# Patient Record
Sex: MALE | Race: WHITE | NOT HISPANIC OR LATINO | Employment: FULL TIME | URBAN - METROPOLITAN AREA
[De-identification: names, ages, dates, MRNs, and addresses within clinical notes are randomized per-mention and may not be internally consistent; named-entity substitution may affect disease eponyms.]

---

## 2019-01-09 ENCOUNTER — APPOINTMENT (OUTPATIENT)
Dept: RADIOLOGY | Facility: MEDICAL CENTER | Age: 34
End: 2019-01-09
Attending: EMERGENCY MEDICINE
Payer: COMMERCIAL

## 2019-01-09 ENCOUNTER — HOSPITAL ENCOUNTER (EMERGENCY)
Facility: MEDICAL CENTER | Age: 34
End: 2019-01-09
Attending: EMERGENCY MEDICINE
Payer: COMMERCIAL

## 2019-01-09 VITALS
HEART RATE: 63 BPM | DIASTOLIC BLOOD PRESSURE: 80 MMHG | OXYGEN SATURATION: 96 % | SYSTOLIC BLOOD PRESSURE: 118 MMHG | RESPIRATION RATE: 20 BRPM | BODY MASS INDEX: 29.54 KG/M2 | WEIGHT: 222.88 LBS | HEIGHT: 73 IN | TEMPERATURE: 98.6 F

## 2019-01-09 DIAGNOSIS — K52.9 COLITIS: ICD-10-CM

## 2019-01-09 LAB
ALBUMIN SERPL BCP-MCNC: 4.4 G/DL (ref 3.2–4.9)
ALBUMIN/GLOB SERPL: 1.4 G/DL
ALP SERPL-CCNC: 75 U/L (ref 30–99)
ALT SERPL-CCNC: 29 U/L (ref 2–50)
ANION GAP SERPL CALC-SCNC: 9 MMOL/L (ref 0–11.9)
APPEARANCE UR: CLEAR
AST SERPL-CCNC: 20 U/L (ref 12–45)
BASOPHILS # BLD AUTO: 0.8 % (ref 0–1.8)
BASOPHILS # BLD: 0.06 K/UL (ref 0–0.12)
BILIRUB SERPL-MCNC: 1.2 MG/DL (ref 0.1–1.5)
BILIRUB UR QL STRIP.AUTO: NEGATIVE
BUN SERPL-MCNC: 17 MG/DL (ref 8–22)
CALCIUM SERPL-MCNC: 9.7 MG/DL (ref 8.4–10.2)
CHLORIDE SERPL-SCNC: 102 MMOL/L (ref 96–112)
CO2 SERPL-SCNC: 26 MMOL/L (ref 20–33)
COLOR UR: YELLOW
CREAT SERPL-MCNC: 1.31 MG/DL (ref 0.5–1.4)
EOSINOPHIL # BLD AUTO: 0.08 K/UL (ref 0–0.51)
EOSINOPHIL NFR BLD: 1.1 % (ref 0–6.9)
ERYTHROCYTE [DISTWIDTH] IN BLOOD BY AUTOMATED COUNT: 36.5 FL (ref 35.9–50)
GLOBULIN SER CALC-MCNC: 3.2 G/DL (ref 1.9–3.5)
GLUCOSE SERPL-MCNC: 96 MG/DL (ref 65–99)
GLUCOSE UR STRIP.AUTO-MCNC: NEGATIVE MG/DL
HCT VFR BLD AUTO: 48.6 % (ref 42–52)
HGB BLD-MCNC: 17.5 G/DL (ref 14–18)
IMM GRANULOCYTES # BLD AUTO: 0.03 K/UL (ref 0–0.11)
IMM GRANULOCYTES NFR BLD AUTO: 0.4 % (ref 0–0.9)
KETONES UR STRIP.AUTO-MCNC: NEGATIVE MG/DL
LEUKOCYTE ESTERASE UR QL STRIP.AUTO: NEGATIVE
LIPASE SERPL-CCNC: 26 U/L (ref 7–58)
LYMPHOCYTES # BLD AUTO: 2.84 K/UL (ref 1–4.8)
LYMPHOCYTES NFR BLD: 37.5 % (ref 22–41)
MCH RBC QN AUTO: 30.7 PG (ref 27–33)
MCHC RBC AUTO-ENTMCNC: 36 G/DL (ref 33.7–35.3)
MCV RBC AUTO: 85.3 FL (ref 81.4–97.8)
MICRO URNS: NORMAL
MONOCYTES # BLD AUTO: 0.83 K/UL (ref 0–0.85)
MONOCYTES NFR BLD AUTO: 11 % (ref 0–13.4)
NEUTROPHILS # BLD AUTO: 3.73 K/UL (ref 1.82–7.42)
NEUTROPHILS NFR BLD: 49.2 % (ref 44–72)
NITRITE UR QL STRIP.AUTO: NEGATIVE
NRBC # BLD AUTO: 0 K/UL
NRBC BLD-RTO: 0 /100 WBC
PH UR STRIP.AUTO: 5.5 [PH]
PLATELET # BLD AUTO: 231 K/UL (ref 164–446)
PMV BLD AUTO: 10.6 FL (ref 9–12.9)
POTASSIUM SERPL-SCNC: 3.8 MMOL/L (ref 3.6–5.5)
PROT SERPL-MCNC: 7.6 G/DL (ref 6–8.2)
PROT UR QL STRIP: NEGATIVE MG/DL
RBC # BLD AUTO: 5.7 M/UL (ref 4.7–6.1)
RBC UR QL AUTO: NEGATIVE
SODIUM SERPL-SCNC: 137 MMOL/L (ref 135–145)
SP GR UR STRIP.AUTO: 1.02
WBC # BLD AUTO: 7.6 K/UL (ref 4.8–10.8)

## 2019-01-09 PROCEDURE — 700102 HCHG RX REV CODE 250 W/ 637 OVERRIDE(OP): Performed by: EMERGENCY MEDICINE

## 2019-01-09 PROCEDURE — 83690 ASSAY OF LIPASE: CPT

## 2019-01-09 PROCEDURE — 99285 EMERGENCY DEPT VISIT HI MDM: CPT

## 2019-01-09 PROCEDURE — 36415 COLL VENOUS BLD VENIPUNCTURE: CPT

## 2019-01-09 PROCEDURE — 80053 COMPREHEN METABOLIC PANEL: CPT

## 2019-01-09 PROCEDURE — 74176 CT ABD & PELVIS W/O CONTRAST: CPT

## 2019-01-09 PROCEDURE — 85025 COMPLETE CBC W/AUTO DIFF WBC: CPT

## 2019-01-09 PROCEDURE — 81003 URINALYSIS AUTO W/O SCOPE: CPT

## 2019-01-09 PROCEDURE — A9270 NON-COVERED ITEM OR SERVICE: HCPCS | Performed by: EMERGENCY MEDICINE

## 2019-01-09 RX ORDER — CEFDINIR 300 MG/1
300 CAPSULE ORAL 2 TIMES DAILY
Qty: 20 CAP | Refills: 0 | Status: SHIPPED | OUTPATIENT
Start: 2019-01-09 | End: 2020-06-19

## 2019-01-09 RX ORDER — METRONIDAZOLE 500 MG/1
500 TABLET ORAL ONCE
Status: COMPLETED | OUTPATIENT
Start: 2019-01-09 | End: 2019-01-09

## 2019-01-09 RX ORDER — METRONIDAZOLE 500 MG/1
500 TABLET ORAL 3 TIMES DAILY
Qty: 21 TAB | Refills: 0 | Status: SHIPPED | OUTPATIENT
Start: 2019-01-09 | End: 2019-01-16

## 2019-01-09 RX ORDER — CEFDINIR 300 MG/1
300 CAPSULE ORAL ONCE
Status: COMPLETED | OUTPATIENT
Start: 2019-01-09 | End: 2019-01-09

## 2019-01-09 RX ORDER — TRAMADOL HYDROCHLORIDE 50 MG/1
50-100 TABLET ORAL EVERY 6 HOURS PRN
Qty: 20 TAB | Refills: 0 | Status: SHIPPED | OUTPATIENT
Start: 2019-01-09 | End: 2019-01-16

## 2019-01-09 RX ADMIN — METRONIDAZOLE 500 MG: 500 TABLET ORAL at 09:55

## 2019-01-09 RX ADMIN — CEFDINIR 300 MG: 300 CAPSULE ORAL at 09:55

## 2019-01-09 ASSESSMENT — PAIN DESCRIPTION - DESCRIPTORS: DESCRIPTORS: ACHING

## 2019-01-09 ASSESSMENT — PAIN SCALES - GENERAL
PAINLEVEL_OUTOF10: 1
PAINLEVEL_OUTOF10: 1

## 2019-01-09 NOTE — ED NOTES
Med rec complete per pt at bedside  Allergies have been verified   No oral ABX within the last 30 days

## 2019-01-09 NOTE — ED PROVIDER NOTES
"ED Provider Note    CHIEF COMPLAINT  Chief Complaint   Patient presents with   • Abdominal Pain     L upper and lower quadrant, described as \"unconfortable\" rated at 1/10 today, issue going on for the past week with exacerbation when exerting especially when \"working out.\"    • Diarrhea     for the last 3 days, \"watery\" no blood seen.    • Nausea     x1.        HPI  Franco Meléndez is a 33 y.o. male who presents stating that he has had some left upper and lower quadrant abdominal pain described as a low level but going on for about a week especially when he is working out.  He is had some watery diarrhea without blood for the last 3 days and mild nausea without vomiting.  Denies any fevers or chills and denies any other complaints    REVIEW OF SYSTEMS  See HPI for further details. All other systems are negative.     PAST MEDICAL HISTORY  History reviewed. No pertinent past medical history.    FAMILY HISTORY  History reviewed. No pertinent family history.    SOCIAL HISTORY   reports that he has never smoked. He has never used smokeless tobacco. He reports that he drinks alcohol. He reports that he does not use drugs.    SURGICAL HISTORY  History reviewed. No pertinent surgical history.    CURRENT MEDICATIONS  Home Medications     Reviewed by Smith Neal (Pharmacy Tech) on 01/09/19 at 0826  Med List Status: Complete   Medication Last Dose Status   DM-Doxylamine-Acetaminophen (NYQUIL COLD & FLU PO) 1/6/2019 Active   Pseudoephedrine-APAP-DM (DAYQUIL MULTI-SYMPTOM COLD/FLU PO) >1WEEK Active                ALLERGIES  No Known Allergies    PHYSICAL EXAM  VITAL SIGNS: /80   Pulse 74   Temp 37 °C (98.6 °F) (Temporal)   Resp 20   Ht 1.854 m (6' 1\")   Wt 101.1 kg (222 lb 14.2 oz)   SpO2 95%   BMI 29.41 kg/m²    Constitutional: Well developed, Well nourished, No acute distress, Non-toxic appearance.   HENT: Normocephalic, Atraumatic, Bilateral external ears normal, Oropharynx is clear mucous membranes are " moist. No oral exudates or nasal discharge.   Eyes: Pupils are equal round and reactive, EOMI, Conjunctiva normal, No discharge.   Neck: Normal range of motion, No tenderness, Supple, No stridor. No meningismus.  Lymphatic: No lymphadenopathy noted.   Cardiovascular: Regular rate and rhythm without murmur rub or gallop.  Thorax & Lungs: Clear breath sounds bilaterally without wheezes, rhonchi or rales. There is no chest wall tenderness.   Abdomen: Soft non-tender non-distended. There is no rebound or guarding. No organomegaly is appreciated. Bowel sounds are normal.  Skin: Normal without rash.   Back: No CVA or spinal tenderness.   Extremities: Intact distal pulses, No edema, No tenderness, No cyanosis, No clubbing. Capillary refill is less than 2 seconds.  Musculoskeletal: Good range of motion in all major joints. No tenderness to palpation or major deformities noted.   Neurologic: Alert & oriented x 3, Normal motor function, Normal sensory function, No focal deficits noted. Reflexes are normal.  Psychiatric: Affect normal, Judgment normal, Mood normal. There is no suicidal ideation or patient reported hallucinations.     RADIOLOGY/PROCEDURES  CT-RENAL COLIC EVALUATION(A/P W/O)   Final Result      1.  Mild diffuse thickening of the wall of the colon which can be seen with infectious colitis.      2.  Normal appendix.      3.  Prior left inguinal hernia repair.            COURSE & MEDICAL DECISION MAKING  Pertinent Labs & Imaging studies reviewed. (See chart for details)  I had a low suspicion for musculoskeletal etiology and a higher suspicion for infectious etiology and possibly even kidney stone.    Laboratory evaluation reveals no leukocytosis, shift, anemia, electrolyte derangements or acidosis.  Urinalysis is negative for infection.    CT scan of the abdomen pelvis reveals mild diffuse thickening of the colonic wall with normal appendix.  This looks like he has had infectious colitis.  There is no evidence of  abscess    Patient is treated with Flagyl and Omnicef as an outpatient.  He will return if any significant change in symptoms since given first dose antibiotics prior to discharge    FINAL IMPRESSION  1. Colitis             Electronically signed by: Estrada Kearney, 1/9/2019 8:42 AM

## 2019-01-09 NOTE — ED TRIAGE NOTES
".  Chief Complaint   Patient presents with   • Abdominal Pain     L upper and lower quadrant, described as \"unconfortable\" rated at 1/10 today, issue going on for the past week with exacerbation when exerting especially when \"working out.\"    • Diarrhea     for the last 3 days, \"watery\" no blood seen.    • Nausea     x1.      .Height 1.854 m (6' 1\"), weight 101.1 kg (222 lb 14.2 oz).      "

## 2019-01-09 NOTE — ED NOTES
Reviewed discharge instructions, printed prescriptions for Flagyl and Ultram, and prescription for Omnicef sent to selected pharmacy w/ pt, verbalized understanding to information provided including follow up care, return precautions and diet, denied further questions/concerns.  Pt thanked RN and ERP for care, ambulated from ED.

## 2019-01-14 ENCOUNTER — HOSPITAL ENCOUNTER (OUTPATIENT)
Dept: LAB | Facility: MEDICAL CENTER | Age: 34
End: 2019-01-14
Attending: INTERNAL MEDICINE
Payer: COMMERCIAL

## 2019-01-14 LAB
CRP SERPL HS-MCNC: 0.07 MG/DL (ref 0–0.75)
ERYTHROCYTE [DISTWIDTH] IN BLOOD BY AUTOMATED COUNT: 38.5 FL (ref 35.9–50)
HCT VFR BLD AUTO: 47.7 % (ref 42–52)
HGB BLD-MCNC: 17 G/DL (ref 14–18)
MCH RBC QN AUTO: 30.9 PG (ref 27–33)
MCHC RBC AUTO-ENTMCNC: 35.6 G/DL (ref 33.7–35.3)
MCV RBC AUTO: 86.7 FL (ref 81.4–97.8)
PLATELET # BLD AUTO: 200 K/UL (ref 164–446)
PMV BLD AUTO: 11.8 FL (ref 9–12.9)
RBC # BLD AUTO: 5.5 M/UL (ref 4.7–6.1)
WBC # BLD AUTO: 8.2 K/UL (ref 4.8–10.8)

## 2019-01-14 PROCEDURE — 86140 C-REACTIVE PROTEIN: CPT

## 2019-01-14 PROCEDURE — 36415 COLL VENOUS BLD VENIPUNCTURE: CPT

## 2019-01-14 PROCEDURE — 85027 COMPLETE CBC AUTOMATED: CPT

## 2019-02-16 ENCOUNTER — TELEPHONE (OUTPATIENT)
Dept: URGENT CARE | Facility: CLINIC | Age: 34
End: 2019-02-16

## 2019-02-16 ENCOUNTER — OFFICE VISIT (OUTPATIENT)
Dept: URGENT CARE | Facility: CLINIC | Age: 34
End: 2019-02-16
Payer: COMMERCIAL

## 2019-02-16 VITALS
TEMPERATURE: 97.9 F | WEIGHT: 222 LBS | RESPIRATION RATE: 20 BRPM | OXYGEN SATURATION: 97 % | BODY MASS INDEX: 29.42 KG/M2 | HEIGHT: 73 IN | DIASTOLIC BLOOD PRESSURE: 72 MMHG | HEART RATE: 86 BPM | SYSTOLIC BLOOD PRESSURE: 122 MMHG

## 2019-02-16 DIAGNOSIS — K64.0 GRADE I HEMORRHOIDS: ICD-10-CM

## 2019-02-16 PROCEDURE — 99203 OFFICE O/P NEW LOW 30 MIN: CPT | Performed by: NURSE PRACTITIONER

## 2019-02-16 ASSESSMENT — ENCOUNTER SYMPTOMS
ROS GI COMMENTS: RECTAL PAIN
FEVER: 0
CHILLS: 0

## 2019-02-16 NOTE — PROGRESS NOTES
Notified by pharmacy that benzocaine is not available. RX for topical dibucaine sent to patient's pharmacy.

## 2019-02-16 NOTE — TELEPHONE ENCOUNTER
RX for benzociane ointment needs to be replaced with another medication.  Current RX is out of stock in all of [place].     Please call [phone]

## 2019-02-16 NOTE — PROGRESS NOTES
"Subjective:      Franco Meléndez is a 33 y.o. male who presents with Hemorrhoids    History reviewed. No pertinent past medical history.  Social History     Social History   • Marital status:      Spouse name: N/A   • Number of children: N/A   • Years of education: N/A     Occupational History   • Not on file.     Social History Main Topics   • Smoking status: Never Smoker   • Smokeless tobacco: Never Used   • Alcohol use Yes      Comment: occ   • Drug use: No   • Sexual activity: Not on file     Other Topics Concern   • Not on file     Social History Narrative   • No narrative on file     History reviewed. No pertinent family history.    Allergies: Patient has no known allergies.      Patient is a 33-year-old male who presents today with complaint of hemorrhoid.  Positive prior history.  Has been using hydrocortisone 2.5% topical without relief.  Symptoms started over the last 2 days.  He does have a gastroenterologist and states he will call Monday for follow-up appointment.        Other   This is a new problem. The current episode started in the past 7 days. The problem occurs constantly. The problem has been unchanged. Pertinent negatives include no chills or fever. Nothing aggravates the symptoms. He has tried nothing for the symptoms. The treatment provided no relief.       Review of Systems   Constitutional: Negative for chills and fever.   Gastrointestinal:        Rectal pain    All other systems reviewed and are negative.         Objective:     /72 (BP Location: Right arm, Patient Position: Standing, BP Cuff Size: Adult)   Pulse 86   Temp 36.6 °C (97.9 °F) (Temporal)   Resp 20   Ht 1.854 m (6' 1\")   Wt 100.7 kg (222 lb)   SpO2 97%   BMI 29.29 kg/m²      Physical Exam   Constitutional: He appears well-developed and well-nourished.   Genitourinary:   Genitourinary Comments: Moderate to large external hemorrhoid noted.  No active bleeding.   Skin: Skin is warm and dry. Capillary refill " takes less than 2 seconds.   Psychiatric: He has a normal mood and affect. His behavior is normal. Judgment and thought content normal.               Assessment/Plan:   Hemorrhoid    Continue topical hydrocortisone  RX for benzocaine gel topical for pain  Follow up with GI  Return to  for any further questions or concerns  There are no diagnoses linked to this encounter.

## 2019-02-21 NOTE — TELEPHONE ENCOUNTER
Pharmacy called a second time to have a third medication called in.  Pharmacist spoke with provider directly.

## 2020-06-08 ENCOUNTER — TELEPHONE (OUTPATIENT)
Dept: SCHEDULING | Facility: IMAGING CENTER | Age: 35
End: 2020-06-08

## 2020-06-19 ENCOUNTER — OFFICE VISIT (OUTPATIENT)
Dept: MEDICAL GROUP | Facility: MEDICAL CENTER | Age: 35
End: 2020-06-19
Payer: COMMERCIAL

## 2020-06-19 VITALS
RESPIRATION RATE: 12 BRPM | TEMPERATURE: 98.6 F | OXYGEN SATURATION: 95 % | BODY MASS INDEX: 32.78 KG/M2 | HEIGHT: 73 IN | HEART RATE: 80 BPM | SYSTOLIC BLOOD PRESSURE: 106 MMHG | WEIGHT: 247.36 LBS | DIASTOLIC BLOOD PRESSURE: 64 MMHG

## 2020-06-19 DIAGNOSIS — E66.9 OBESITY (BMI 30-39.9): ICD-10-CM

## 2020-06-19 DIAGNOSIS — Z11.4 SCREENING FOR HIV (HUMAN IMMUNODEFICIENCY VIRUS): ICD-10-CM

## 2020-06-19 DIAGNOSIS — L91.8 CUTANEOUS SKIN TAGS: ICD-10-CM

## 2020-06-19 DIAGNOSIS — Z00.00 ENCOUNTER FOR PREVENTIVE HEALTH EXAMINATION: ICD-10-CM

## 2020-06-19 DIAGNOSIS — G47.00 INSOMNIA, UNSPECIFIED TYPE: ICD-10-CM

## 2020-06-19 DIAGNOSIS — F41.9 MODERATE ANXIETY: ICD-10-CM

## 2020-06-19 DIAGNOSIS — F33.1 MODERATE EPISODE OF RECURRENT MAJOR DEPRESSIVE DISORDER (HCC): ICD-10-CM

## 2020-06-19 DIAGNOSIS — L98.9 SKIN LESIONS: ICD-10-CM

## 2020-06-19 PROBLEM — F32.9 MAJOR DEPRESSIVE DISORDER: Status: ACTIVE | Noted: 2020-06-19

## 2020-06-19 PROCEDURE — 99203 OFFICE O/P NEW LOW 30 MIN: CPT | Performed by: INTERNAL MEDICINE

## 2020-06-19 RX ORDER — TRAZODONE HYDROCHLORIDE 50 MG/1
50-100 TABLET ORAL NIGHTLY PRN
Qty: 30 TAB | Refills: 1 | Status: SHIPPED | OUTPATIENT
Start: 2020-06-19 | End: 2021-03-04

## 2020-06-19 SDOH — HEALTH STABILITY: MENTAL HEALTH: HOW OFTEN DO YOU HAVE A DRINK CONTAINING ALCOHOL?: NEVER

## 2020-06-19 ASSESSMENT — ANXIETY QUESTIONNAIRES
2. NOT BEING ABLE TO STOP OR CONTROL WORRYING: MORE THAN HALF THE DAYS
4. TROUBLE RELAXING: NEARLY EVERY DAY
6. BECOMING EASILY ANNOYED OR IRRITABLE: NEARLY EVERY DAY
5. BEING SO RESTLESS THAT IT IS HARD TO SIT STILL: SEVERAL DAYS
7. FEELING AFRAID AS IF SOMETHING AWFUL MIGHT HAPPEN: SEVERAL DAYS
1. FEELING NERVOUS, ANXIOUS, OR ON EDGE: MORE THAN HALF THE DAYS
GAD7 TOTAL SCORE: 14
3. WORRYING TOO MUCH ABOUT DIFFERENT THINGS: MORE THAN HALF THE DAYS

## 2020-06-19 ASSESSMENT — FIBROSIS 4 INDEX: FIB4 SCORE: 0.61

## 2020-06-19 ASSESSMENT — PATIENT HEALTH QUESTIONNAIRE - PHQ9
CLINICAL INTERPRETATION OF PHQ2 SCORE: 2
SUM OF ALL RESPONSES TO PHQ QUESTIONS 1-9: 9
5. POOR APPETITE OR OVEREATING: 2 - MORE THAN HALF THE DAYS

## 2020-06-19 NOTE — PROGRESS NOTES
New Patient to Establish    Reason to establish: New patient to establish    Franco Meléndez is a 34 y.o. male who presents today with the following:    CC:   Chief Complaint   Patient presents with   • Establish Care   • Requesting Labs     Thyroid Function, x 6 mos., emotional changes   • Referral Needed     dermatology, skin tags       HPI:     Skin lesions  Several round smooth raise lesions.       Cutaneous skin tags  Skin tag in let armpit and one on left scrotum. Irritating. Would like them removed.     Depression Screening    Little interest or pleasure in doing things?  1 - several days   Feeling down, depressed , or hopeless? 1 - several days   Trouble falling or staying asleep, or sleeping too much?  3 - nearly every day   Feeling tired or having little energy?  1 - several days   Poor appetite or overeating?  2 - more than half the days   Feeling bad about yourself - or that you are a failure or have let yourself or your family down? 0 - not at all   Trouble concentrating on things, such as reading the newspaper or watching television? 0 - not at all   Moving or speaking so slowly that other people could have noticed.  Or the opposite - being so fidgety or restless that you have been moving around a lot more than usual?  0 - not at all   Thoughts that you would be better off dead, or of hurting yourself?  1 - several days   Patient Health Questionnaire Score: 9       If depressive symptoms identified deferred to follow up visit unless specifically addressed in assesment and plan.    Interpretation of PHQ-9 Total Score   Score Severity   1-4 No Depression   5-9 Mild Depression   10-14 Moderate Depression   15-19 Moderately Severe Depression   20-27 Severe Depression    KOBY-7 Questionnaire    Feeling nervous, anxious, or on edge: More than half the days  Not being able to sop or control worrying: More than half the days  Worrying too much about different things: More than half the days  Trouble relaxing:  "Nearly every day  Being so restless that it's hard to sit still: Several days  Becoming easily annoyed or irritable: Nearly every day  Feeling afraid as if something awful might happen: Several days  Total: 14    Interpretation of KOBY 7 Total Score   Score Severity :  0-4 No Anxiety   5-9 Mild Anxiety  10-14 Moderate Anxiety  15-21 Severe Anxiety              Current Outpatient Medications:   •  traZODone (DESYREL) 50 MG Tab, Take 1-2 Tabs by mouth at bedtime as needed for Sleep., Disp: 30 Tab, Rfl: 1    Allergies, past medical history, past surgical history, medications, family history, social history reviewed and updated.    ROS     Constitutional: Denies fevers or chills  Eyes: Denies changes in vision  Ears/Nose/Throat/Mouth: Denies nasal congestion or sore throat   Cardiovascular: Denies chest pain or palpitations   Respiratory: Denies shortness of breath , Denies cough  Gastrointestinal/Hepatic: Denies abd pain, nausea, vomiting   Genitourinary: Denies dysuria or frequency  Musculoskeletal/Rheum: Denies joint pain and swelling   Neurological: Denies headache  Psychiatric: Denies mood disorder   Endocrine: Denies hx of diabetes or thyroid dysfunction  Heme/Oncology/Lymph Nodes: Denies weight changes or enlarged LNs.    Physical Exam  /64 (BP Location: Right arm, Patient Position: Sitting, BP Cuff Size: Adult)   Pulse 80   Temp 37 °C (98.6 °F) (Temporal)   Resp 12   Ht 1.854 m (6' 1\")   Wt 112.2 kg (247 lb 5.7 oz) Comment: 112.2kg  SpO2 95%   BMI 32.63 kg/m²   General: Normal appearance.  Well developed, well nourished, no acute distress.  HEENT: Normocephalic.  Extraocular motion intact. Pupils are equally round, reactive to light and accommodation, conjunctiva clear, no scleral icterus.  Ears: normal shape and contour, ear canals clear, tympanic membranes intact. Hearing intact.  Oropharynx clear, no erythema, edema or exudate noted.  NECK: Thyroid is not enlarged. No JVD.  No carotid bruits. No " "masses.  Cardiovascular: Regular rhythm and rate. No murmur/rubs/gallops.   Respiratory: Normal respiratory effort, clear to auscultation bilaterally. No wheezing/rales/rhonchi.    Abdomen: Bowel sounds present, soft, nontender, nondistended, no rebound, no guarding. No hepatosplenomegaly.  : No suprapubic tenderness. No CVA tenderness.   EXT: no LE edema b/l. No cyanosis.  No clubbing.  Lymph: No cervical, supraclavicular or axillary lymph nodes are palpable  Skin: Warm and dry.  No suspicious lesions or rashes.   Neurologic: No focal deficits.   Psych: AAOx3,  Normal mood and affect, normal judgment and insight, memory within normal limits      Assessment and Plan    1. Insomnia, unspecified type  - traZODone (DESYREL) 50 MG Tab; Take 1-2 Tabs by mouth at bedtime as needed for Sleep.  Dispense: 30 Tab; Refill: 1    Good sleep hygiene:  --Keep a regular sleep schedule. Going to bed at same time and waking up at same time (even on weekends).   --Go to bed only when sleepy. Sleep only as much as you need to feel rested and then get out of bed  --Bed for sleep and sex only.  Do not read or watch TV in bed.  --Avoid TV, computer/tablet screens for 2 hours prior to bedtime or wear blue light blocking sunglasses. Avoid prolonged use of light-emitting screens before bedtime.  --Dark, cool, quiet bedroom. \"White noise\" (ie from a fan) may be helpful.  --No caffeinated beverages after lunch; do not use alcohol for sleep. Avoid alcohol near bedtime.  --Do not go to bed hungry  -- Do not take a nap during the day.  --Exercise regularly, preferably at least 4 to 5 hours before bedtime  --Make the bedroom environment conducive to sleep  --Can try Valerian root or melatonin, which are both over the counter.  -- Avoid smoking, especially in the evening  --Deal with your worries before bedtime  -- Get out of bed if unable to fall asleep within 20 minutes and go to another room. Return to bed only when sleepy. Repeat this step as " many times as necessary throughout the night.  Stimulus control therapy rule    Relaxation -- Relaxation therapy involves progressively relaxing your muscles from your head down to your feet. Here is a sample of a relaxation program: Beginning with the muscles in your face, squeeze (contract) your muscles gently for one to two seconds and then relax. Repeat several times. Use the same technique for other muscle groups, usually in the following sequence: jaw and neck, shoulders, upper arms, lower arms, fingers, chest, abdomen, buttocks, thighs, calves, and feet. Repeat this cycle for 45 minutes, if necessary. This relaxation program can promote restfulness and sleep.      2. Moderate episode of recurrent major depressive disorder (HCC)  - REFERRAL TO BEHAVIORAL HEALTH  - TSH WITH REFLEX TO FT4; Future    3. Moderate anxiety  - REFERRAL TO BEHAVIORAL HEALTH  - TSH WITH REFLEX TO FT4; Future    4. Skin lesions  - REFERRAL TO DERMATOLOGY    5. Cutaneous skin tags  - REFERRAL TO DERMATOLOGY    6. Obesity (BMI 30-39.9)  - Patient identified as having weight management issue.  Appropriate orders and counseling given.    7. Encounter for preventive health examination  - CBC WITH DIFFERENTIAL; Future  - Comp Metabolic Panel; Future  - Lipid Profile; Future  - VITAMIN D,25 HYDROXY; Future    8. Screening for HIV (human immunodeficiency virus)  - HIV AG/AB COMBO ASSAY SCREENING; Future        Follow-up:Return if symptoms worsen or fail to improve.    This note was created using voice recognition software. There may be unintended errors in spelling, grammar or content.

## 2020-06-22 ENCOUNTER — HOSPITAL ENCOUNTER (OUTPATIENT)
Dept: LAB | Facility: MEDICAL CENTER | Age: 35
End: 2020-06-22
Attending: INTERNAL MEDICINE
Payer: COMMERCIAL

## 2020-06-22 ENCOUNTER — TELEPHONE (OUTPATIENT)
Dept: MEDICAL GROUP | Facility: MEDICAL CENTER | Age: 35
End: 2020-06-22

## 2020-06-22 DIAGNOSIS — Z11.4 SCREENING FOR HIV (HUMAN IMMUNODEFICIENCY VIRUS): ICD-10-CM

## 2020-06-22 DIAGNOSIS — F41.9 MODERATE ANXIETY: ICD-10-CM

## 2020-06-22 DIAGNOSIS — Z00.00 ENCOUNTER FOR PREVENTIVE HEALTH EXAMINATION: ICD-10-CM

## 2020-06-22 DIAGNOSIS — F33.1 MODERATE EPISODE OF RECURRENT MAJOR DEPRESSIVE DISORDER (HCC): ICD-10-CM

## 2020-06-22 LAB
25(OH)D3 SERPL-MCNC: 17 NG/ML (ref 30–100)
ALBUMIN SERPL BCP-MCNC: 4.7 G/DL (ref 3.2–4.9)
ALBUMIN/GLOB SERPL: 1.6 G/DL
ALP SERPL-CCNC: 62 U/L (ref 30–99)
ALT SERPL-CCNC: 51 U/L (ref 2–50)
ANION GAP SERPL CALC-SCNC: 10 MMOL/L (ref 7–16)
AST SERPL-CCNC: 27 U/L (ref 12–45)
BASOPHILS # BLD AUTO: 0.7 % (ref 0–1.8)
BASOPHILS # BLD: 0.05 K/UL (ref 0–0.12)
BILIRUB SERPL-MCNC: 0.8 MG/DL (ref 0.1–1.5)
BUN SERPL-MCNC: 14 MG/DL (ref 8–22)
CALCIUM SERPL-MCNC: 9.6 MG/DL (ref 8.4–10.2)
CHLORIDE SERPL-SCNC: 104 MMOL/L (ref 96–112)
CHOLEST SERPL-MCNC: 228 MG/DL (ref 100–199)
CO2 SERPL-SCNC: 23 MMOL/L (ref 20–33)
CREAT SERPL-MCNC: 1.12 MG/DL (ref 0.5–1.4)
EOSINOPHIL # BLD AUTO: 0.1 K/UL (ref 0–0.51)
EOSINOPHIL NFR BLD: 1.5 % (ref 0–6.9)
ERYTHROCYTE [DISTWIDTH] IN BLOOD BY AUTOMATED COUNT: 38.3 FL (ref 35.9–50)
FASTING STATUS PATIENT QL REPORTED: NORMAL
GLOBULIN SER CALC-MCNC: 3 G/DL (ref 1.9–3.5)
GLUCOSE SERPL-MCNC: 100 MG/DL (ref 65–99)
HCT VFR BLD AUTO: 48.9 % (ref 42–52)
HDLC SERPL-MCNC: 56 MG/DL
HGB BLD-MCNC: 17.4 G/DL (ref 14–18)
HIV 1+2 AB+HIV1 P24 AG SERPL QL IA: NORMAL
IMM GRANULOCYTES # BLD AUTO: 0.01 K/UL (ref 0–0.11)
IMM GRANULOCYTES NFR BLD AUTO: 0.1 % (ref 0–0.9)
LDLC SERPL CALC-MCNC: 146 MG/DL
LYMPHOCYTES # BLD AUTO: 2.59 K/UL (ref 1–4.8)
LYMPHOCYTES NFR BLD: 38.3 % (ref 22–41)
MCH RBC QN AUTO: 30.9 PG (ref 27–33)
MCHC RBC AUTO-ENTMCNC: 35.6 G/DL (ref 33.7–35.3)
MCV RBC AUTO: 86.9 FL (ref 81.4–97.8)
MONOCYTES # BLD AUTO: 0.82 K/UL (ref 0–0.85)
MONOCYTES NFR BLD AUTO: 12.1 % (ref 0–13.4)
NEUTROPHILS # BLD AUTO: 3.19 K/UL (ref 1.82–7.42)
NEUTROPHILS NFR BLD: 47.3 % (ref 44–72)
NRBC # BLD AUTO: 0 K/UL
NRBC BLD-RTO: 0 /100 WBC
PLATELET # BLD AUTO: 206 K/UL (ref 164–446)
PMV BLD AUTO: 10.7 FL (ref 9–12.9)
POTASSIUM SERPL-SCNC: 4.3 MMOL/L (ref 3.6–5.5)
PROT SERPL-MCNC: 7.7 G/DL (ref 6–8.2)
RBC # BLD AUTO: 5.63 M/UL (ref 4.7–6.1)
SODIUM SERPL-SCNC: 137 MMOL/L (ref 135–145)
TRIGL SERPL-MCNC: 131 MG/DL (ref 0–149)
TSH SERPL DL<=0.005 MIU/L-ACNC: 2.11 UIU/ML (ref 0.38–5.33)
WBC # BLD AUTO: 6.8 K/UL (ref 4.8–10.8)

## 2020-06-22 PROCEDURE — 36415 COLL VENOUS BLD VENIPUNCTURE: CPT

## 2020-06-22 PROCEDURE — 85025 COMPLETE CBC W/AUTO DIFF WBC: CPT

## 2020-06-22 PROCEDURE — 82306 VITAMIN D 25 HYDROXY: CPT

## 2020-06-22 PROCEDURE — 80061 LIPID PANEL: CPT

## 2020-06-22 PROCEDURE — 80053 COMPREHEN METABOLIC PANEL: CPT

## 2020-06-22 PROCEDURE — 84443 ASSAY THYROID STIM HORMONE: CPT

## 2020-06-22 PROCEDURE — 87389 HIV-1 AG W/HIV-1&-2 AB AG IA: CPT

## 2020-06-22 NOTE — TELEPHONE ENCOUNTER
Phone Number Called: [phone] (home)       Call outcome: Left detailed message for patient. Informed to call back with any additional questions.    Message: Left VM requesting a return phone call. Attempting to schedule an appointment in 2 weeks to go over lab results.

## 2020-06-22 NOTE — TELEPHONE ENCOUNTER
----- Message from Hansa Gauthier M.D. sent at 6/22/2020 11:00 AM PDT -----  Please call patient and schedule an in person appointment with Dr. Gauthier in 2 weeks to review lab.     Thanks!

## 2020-06-29 ENCOUNTER — OFFICE VISIT (OUTPATIENT)
Dept: MEDICAL GROUP | Facility: MEDICAL CENTER | Age: 35
End: 2020-06-29
Payer: COMMERCIAL

## 2020-06-29 ENCOUNTER — HOSPITAL ENCOUNTER (OUTPATIENT)
Dept: LAB | Facility: MEDICAL CENTER | Age: 35
End: 2020-06-29
Attending: INTERNAL MEDICINE
Payer: COMMERCIAL

## 2020-06-29 ENCOUNTER — TELEPHONE (OUTPATIENT)
Dept: MEDICAL GROUP | Facility: MEDICAL CENTER | Age: 35
End: 2020-06-29

## 2020-06-29 VITALS
WEIGHT: 246.47 LBS | RESPIRATION RATE: 12 BRPM | BODY MASS INDEX: 32.67 KG/M2 | OXYGEN SATURATION: 95 % | SYSTOLIC BLOOD PRESSURE: 110 MMHG | TEMPERATURE: 98.3 F | HEART RATE: 72 BPM | HEIGHT: 73 IN | DIASTOLIC BLOOD PRESSURE: 68 MMHG

## 2020-06-29 DIAGNOSIS — E55.9 VITAMIN D DEFICIENCY: ICD-10-CM

## 2020-06-29 DIAGNOSIS — R73.01 IMPAIRED FASTING GLUCOSE: ICD-10-CM

## 2020-06-29 DIAGNOSIS — R74.01 TRANSAMINITIS: ICD-10-CM

## 2020-06-29 DIAGNOSIS — E78.5 DYSLIPIDEMIA: ICD-10-CM

## 2020-06-29 LAB
EST. AVERAGE GLUCOSE BLD GHB EST-MCNC: 103 MG/DL
FERRITIN SERPL-MCNC: 707 NG/ML (ref 22–322)
HBA1C MFR BLD: 5.2 % (ref 0–5.6)
HBV CORE AB SERPL QL IA: NONREACTIVE
HBV SURFACE AB SERPL IA-ACNC: 14.9 MIU/ML (ref 0–10)
HBV SURFACE AG SER QL: NORMAL
HCV AB SER QL: NORMAL
IRON SATN MFR SERPL: 40 % (ref 15–55)
IRON SERPL-MCNC: 112 UG/DL (ref 50–180)
TIBC SERPL-MCNC: 280 UG/DL (ref 250–450)
UIBC SERPL-MCNC: 168 UG/DL (ref 110–370)

## 2020-06-29 PROCEDURE — 81256 HFE GENE: CPT

## 2020-06-29 PROCEDURE — 86704 HEP B CORE ANTIBODY TOTAL: CPT

## 2020-06-29 PROCEDURE — 86803 HEPATITIS C AB TEST: CPT

## 2020-06-29 PROCEDURE — 87340 HEPATITIS B SURFACE AG IA: CPT

## 2020-06-29 PROCEDURE — 83036 HEMOGLOBIN GLYCOSYLATED A1C: CPT

## 2020-06-29 PROCEDURE — 83550 IRON BINDING TEST: CPT

## 2020-06-29 PROCEDURE — 83540 ASSAY OF IRON: CPT

## 2020-06-29 PROCEDURE — 36415 COLL VENOUS BLD VENIPUNCTURE: CPT

## 2020-06-29 PROCEDURE — 86706 HEP B SURFACE ANTIBODY: CPT

## 2020-06-29 PROCEDURE — 99214 OFFICE O/P EST MOD 30 MIN: CPT | Performed by: INTERNAL MEDICINE

## 2020-06-29 PROCEDURE — 82728 ASSAY OF FERRITIN: CPT

## 2020-06-29 RX ORDER — ERGOCALCIFEROL 1.25 MG/1
50000 CAPSULE ORAL
Qty: 12 CAP | Refills: 0 | Status: SHIPPED | OUTPATIENT
Start: 2020-06-29 | End: 2021-03-04

## 2020-06-29 ASSESSMENT — FIBROSIS 4 INDEX: FIB4 SCORE: 0.61

## 2020-06-29 NOTE — PROGRESS NOTES
Established Patient    Franco Meléndez is a 34 y.o. male who presents today with the following:    CC:   Chief Complaint   Patient presents with   • Follow-Up     Cholesterol, vit D def, elevated liver function       HPI:     Dyslipidemia    Lifestyle modifications        Transaminitis     Ref. Range 6/22/2020 10:15   AST(SGOT) Latest Ref Range: 12 - 45 U/L 27   ALT(SGPT) Latest Ref Range: 2 - 50 U/L 51 (H)   Lifestyle modifications  Avoid ETOH  Check hep panel, iron, US liver      Impaired fasting glucose  Lifestyle modifications      He declines starting SSRI for now. He states he prefers scheduling an appointment with behavioral medicine. Referral contact given to patient    Current Outpatient Medications   Medication Sig Dispense Refill   • vitamin D, Ergocalciferol, (DRISDOL) 1.25 MG (16598 UT) Cap capsule Take 1 Cap by mouth every 7 days. 12 Cap 0   • traZODone (DESYREL) 50 MG Tab Take 1-2 Tabs by mouth at bedtime as needed for Sleep. 30 Tab 1     No current facility-administered medications for this visit.        Allergies, past medical history, past surgical history, medications, family history, social history reviewed and updated.    ROS   Constitutional: Denies fevers or chills  Eyes: Denies changes in vision  Ears/Nose/Throat/Mouth: Denies nasal congestion or sore throat   Cardiovascular: Denies chest pain or palpitations   Respiratory: Denies shortness of breath , Denies cough  Gastrointestinal/Hepatic: Denies abd pain, nausea, vomiting   Genitourinary: Denies dysuria or frequency  Musculoskeletal/Rheum: Denies joint pain and swelling   Neurological: Denies headache  Psychiatric: depression, anxiety, insomnia, denies SI/HI  Endocrine: Denies hx of diabetes or thyroid dysfunction  Heme/Oncology/Lymph Nodes: Denies weight changes or enlarged LNs.    Physical Exam  Vitals: /68 (BP Location: Left arm, Patient Position: Sitting, BP Cuff Size: Adult)   Pulse 72   Temp 36.8 °C (98.3 °F) (Temporal)    "Resp 12   Ht 1.854 m (6' 1\")   Wt 111.8 kg (246 lb 7.6 oz)   SpO2 95%   BMI 32.52 kg/m²   General: Alert, pleasant, NAD  HEENT: Normocephalic.  EOMI, no icterus or pallor.  Conjunctivae and lids normal. External ears normal. Oropharynx non-erythematous, mucous membranes moist.  Neck supple.  No thyromegaly or masses palpated.   Lymph: No cervical or supraclavicular lymphadenopathy.  Cardiovascular: Regular rate and rhythm.  S1 and S2 normal.  No murmurs appreciated.  Respiratory: Normal respiratory effort.  Clear to auscultation bilaterally.  Abdomen: Non-distended, soft  Skin: Warm, dry, no rashes.  Musculoskeletal: Gait is normal.  Moves all extremities well.  Extremities: No leg edema.  Pedal pulses 2+ symmetric.   Psych:  Affect/mood is normal, judgement is good, memory is intact, grooming is appropriate.      Labs (6/22/2020) were reviewed and discussed with patients.  All questions were answered.      Assessment and Plan    1. Transaminitis  - HEP C VIRUS ANTIBODY; Future  - HEP B SURFACE AB; Future  - HEP B SURFACE ANTIGEN; Future  - HEPATITIS B CORE AB W/REFLEX  - IRON/TOTAL IRON BIND; Future  - FERRITIN; Future  - US-RUQ; Future    2. Vitamin D deficiency  - vitamin D, Ergocalciferol, (DRISDOL) 1.25 MG (51100 UT) Cap capsule; Take 1 Cap by mouth every 7 days.  Dispense: 12 Cap; Refill: 0  Then 1000 IU daily    3. Impaired fasting glucose  - HEMOGLOBIN A1C; Future  Lifestyle modifications    4. Dyslipidemia  Lifestyle modifications      Follow-up:Return if symptoms worsen or fail to improve.    This note was created using voice recognition software. There may be unintended errors in spelling, grammar or content.    "

## 2020-06-29 NOTE — ASSESSMENT & PLAN NOTE
Ref. Range 6/22/2020 10:15   AST(SGOT) Latest Ref Range: 12 - 45 U/L 27   ALT(SGPT) Latest Ref Range: 2 - 50 U/L 51 (H)   Lifestyle modifications  Avoid ETOH  Check hep panel, iron, US liver

## 2020-06-30 NOTE — TELEPHONE ENCOUNTER
Ref. Range 6/29/2020 11:09   Iron Latest Ref Range: 50 - 180 ug/dL 112   Total Iron Binding Latest Ref Range: 250 - 450 ug/dL 280   % Saturation Latest Ref Range: 15 - 55 % 40   Unsat Iron Binding Latest Ref Range: 110 - 370 ug/dL 168   Glycohemoglobin Latest Ref Range: 0.0 - 5.6 % 5.2   Estim. Avg Glu Latest Units: mg/dL 103        Ref. Range 6/29/2020 11:09   Ferritin Latest Ref Range: 22.0 - 322.0 ng/mL 707.0 (H)   Hep B Surface Antibody Quant Latest Ref Range: 0.00 - 10.00 mIU/mL 14.90 (H)   Hepatitis B Surface Antigen Latest Ref Range: Non-Reactive  Non-Reactive   Hepatitis B Core Ab, Total Latest Ref Range: Non-Reactive  NonReactive   Hepatitis C Antibody Latest Ref Range: Non-Reactive  Non-Reactive     Check for hemochromatosis

## 2020-07-07 LAB
HFE GENE MUT ANL BLD/T: NORMAL
HFE P.C282Y BLD/T QL: NEGATIVE
HFE P.H63D BLD/T QL: NORMAL
HFE P.S65C BLD/T QL: NEGATIVE

## 2020-07-09 ENCOUNTER — HOSPITAL ENCOUNTER (OUTPATIENT)
Dept: RADIOLOGY | Facility: MEDICAL CENTER | Age: 35
End: 2020-07-09
Attending: INTERNAL MEDICINE
Payer: COMMERCIAL

## 2020-07-09 DIAGNOSIS — R74.01 TRANSAMINITIS: ICD-10-CM

## 2020-07-09 PROCEDURE — 76705 ECHO EXAM OF ABDOMEN: CPT

## 2020-07-16 ENCOUNTER — OFFICE VISIT (OUTPATIENT)
Dept: MEDICAL GROUP | Facility: MEDICAL CENTER | Age: 35
End: 2020-07-16
Payer: COMMERCIAL

## 2020-07-16 VITALS
OXYGEN SATURATION: 93 % | HEART RATE: 64 BPM | SYSTOLIC BLOOD PRESSURE: 100 MMHG | WEIGHT: 233.47 LBS | RESPIRATION RATE: 16 BRPM | BODY MASS INDEX: 30.94 KG/M2 | HEIGHT: 73 IN | TEMPERATURE: 98.3 F | DIASTOLIC BLOOD PRESSURE: 66 MMHG

## 2020-07-16 DIAGNOSIS — E83.119 HEMOCHROMATOSIS, UNSPECIFIED HEMOCHROMATOSIS TYPE: ICD-10-CM

## 2020-07-16 DIAGNOSIS — E78.5 DYSLIPIDEMIA: ICD-10-CM

## 2020-07-16 DIAGNOSIS — R74.01 TRANSAMINITIS: ICD-10-CM

## 2020-07-16 DIAGNOSIS — E66.9 OBESITY (BMI 30-39.9): ICD-10-CM

## 2020-07-16 DIAGNOSIS — R73.01 IMPAIRED FASTING GLUCOSE: ICD-10-CM

## 2020-07-16 DIAGNOSIS — K76.0 FATTY LIVER: ICD-10-CM

## 2020-07-16 PROCEDURE — 99214 OFFICE O/P EST MOD 30 MIN: CPT | Performed by: INTERNAL MEDICINE

## 2020-07-16 SDOH — HEALTH STABILITY: PHYSICAL HEALTH: ON AVERAGE, HOW MANY DAYS PER WEEK DO YOU ENGAGE IN MODERATE TO STRENUOUS EXERCISE (LIKE A BRISK WALK)?: 7 DAYS

## 2020-07-16 SDOH — HEALTH STABILITY: PHYSICAL HEALTH: ON AVERAGE, HOW MANY MINUTES DO YOU ENGAGE IN EXERCISE AT THIS LEVEL?: 30 MIN

## 2020-07-16 ASSESSMENT — FIBROSIS 4 INDEX: FIB4 SCORE: 0.61

## 2020-07-16 NOTE — ASSESSMENT & PLAN NOTE
H63D heterozygous mutation     Ref. Range 6/29/2020 11:09   Ferritin Latest Ref Range: 22.0 - 322.0 ng/mL 707.0 (H)      Ref. Range 6/22/2020 10:15   AST(SGOT) Latest Ref Range: 12 - 45 U/L 27   ALT(SGPT) Latest Ref Range: 2 - 50 U/L 51 (H)   Alkaline Phosphatase Latest Ref Range: 30 - 99 U/L 62   Total Bilirubin Latest Ref Range: 0.1 - 1.5 mg/dL 0.8     Monitor iron storage.  Monitor Liver US +/- AFP Q 6 months  Lifestyle modifications for fatty liver, impaired fasting glucose  No chest pain, palpitation, SOB  Avoid iron supplement, vit C, uncooked seafood and ETOH  Consider blood donation

## 2020-07-16 NOTE — ASSESSMENT & PLAN NOTE
Ref. Range 6/29/2020 11:09   Glycohemoglobin Latest Ref Range: 0.0 - 5.6 % 5.2   Estim. Avg Glu Latest Units: mg/dL 103     Lifestyle modifications

## 2020-07-16 NOTE — PROGRESS NOTES
Established Patient    Franco Meléndez is a 34 y.o. male who presents today with the following:    CC:   Chief Complaint   Patient presents with   • Follow-Up     Labs and Ultrasound       HPI:     Hemochromatosis  H63D heterozygous mutation     Ref. Range 6/29/2020 11:09   Ferritin Latest Ref Range: 22.0 - 322.0 ng/mL 707.0 (H)      Ref. Range 6/22/2020 10:15   AST(SGOT) Latest Ref Range: 12 - 45 U/L 27   ALT(SGPT) Latest Ref Range: 2 - 50 U/L 51 (H)   Alkaline Phosphatase Latest Ref Range: 30 - 99 U/L 62   Total Bilirubin Latest Ref Range: 0.1 - 1.5 mg/dL 0.8     Monitor iron storage.  Monitor Liver US +/- AFP Q 6 months  Lifestyle modifications for fatty liver, impaired fasting glucose  No chest pain, palpitation, SOB  Avoid iron supplement, vit C, uncooked seafood and ETOH  Consider blood donation      Impaired fasting glucose     Ref. Range 6/29/2020 11:09   Glycohemoglobin Latest Ref Range: 0.0 - 5.6 % 5.2   Estim. Avg Glu Latest Units: mg/dL 103     Lifestyle modifications          Obesity (BMI 30-39.9)  Body mass index is 30.8 kg/m².  Lifestyle modifications        Transaminitis  Due to fatty liver and hemochromotosis   Ref. Range 6/22/2020 10:15   AST(SGOT) Latest Ref Range: 12 - 45 U/L 27   ALT(SGPT) Latest Ref Range: 2 - 50 U/L 51 (H)   Hepatitis panel unremarkable  Lifestyle modifications  Avoid ETOH        Fatty liver  Lifestyle modifications          Current Outpatient Medications   Medication Sig Dispense Refill   • vitamin D, Ergocalciferol, (DRISDOL) 1.25 MG (49512 UT) Cap capsule Take 1 Cap by mouth every 7 days. 12 Cap 0   • traZODone (DESYREL) 50 MG Tab Take 1-2 Tabs by mouth at bedtime as needed for Sleep. 30 Tab 1     No current facility-administered medications for this visit.        Allergies, past medical history, past surgical history, medications, family history, social history reviewed and updated.    ROS   Constitutional: Denies fevers or chills  Eyes: Denies changes in  "vision  Ears/Nose/Throat/Mouth: Denies nasal congestion or sore throat   Cardiovascular: Denies chest pain or palpitations   Respiratory: Denies shortness of breath , Denies cough  Gastrointestinal/Hepatic: Denies abd pain, nausea, vomiting   Genitourinary: Denies dysuria or frequency  Musculoskeletal/Rheum: Denies joint pain and swelling   Neurological: Denies headache  Psychiatric: mood ok.  Endocrine: Denies hx of diabetes or thyroid dysfunction  Heme/Oncology/Lymph Nodes: Denies weight changes or enlarged LNs.    Physical Exam  Vitals: /66 (BP Location: Left arm, Patient Position: Sitting, BP Cuff Size: Adult)   Pulse 64   Temp 36.8 °C (98.3 °F) (Temporal)   Resp 16   Ht 1.854 m (6' 1\")   Wt 105.9 kg (233 lb 7.5 oz)   SpO2 93%   BMI 30.80 kg/m²   General: Alert, pleasant, NAD  HEENT: Normocephalic.  EOMI, no icterus or pallor.  Conjunctivae and lids normal. External ears normal. Oropharynx non-erythematous, mucous membranes moist.  Neck supple.  No thyromegaly or masses palpated.   Lymph: No cervical or supraclavicular lymphadenopathy.  Cardiovascular: Regular rate and rhythm.  S1 and S2 normal.  No murmurs appreciated.  Respiratory: Normal respiratory effort.  Clear to auscultation bilaterally.  Abdomen: Non-distended, soft, non-tender  Skin: Warm, dry, no rashes.  Musculoskeletal: Gait is normal.  Moves all extremities well.  Extremities: No leg edema.  Pedal pulses 2+ symmetric.   Psych:  Affect/mood is normal, judgement is good, memory is intact, grooming is appropriate.      Labs (6/29/20, 7/9/20) were reviewed and discussed with patients.  Imaging (7/9/20) was reviewed.  All questions were answered.      Assessment and Plan    1. Hemochromatosis, unspecified hemochromatosis type  - US-RUQ; Future  - Comp Metabolic Panel; Future  - FERRITIN; Future  - US-RUQ; Future  - CBC WITH DIFFERENTIAL; Future  - IRON/TOTAL IRON BIND; Future  Monitor iron storage.  Monitor Liver US +/- AFP Q 6 " months  Lifestyle modifications for fatty liver, impaired fasting glucose  No chest pain, palpitation, SOB  Avoid iron supplement, vit C, uncooked seafood and ETOH  Consider blood donation    2. Fatty liver  Lifestyle modifications    3. Transaminitis  - Comp Metabolic Panel; Future  - FERRITIN; Future  - US-RUQ; Future  See #1    4. Impaired fasting glucose  Lifestyle modifications    5. Dyslipidemia  - Lipid Profile; Future  Lifestyle modifications    6. Obesity (BMI 30-39.9)  Lifestyle modifications    Follow-up:Return in about 6 months (around 1/16/2021), or if symptoms worsen or fail to improve.    This note was created using voice recognition software. There may be unintended errors in spelling, grammar or content.

## 2020-07-16 NOTE — ASSESSMENT & PLAN NOTE
Due to fatty liver and hemochromotosis   Ref. Range 6/22/2020 10:15   AST(SGOT) Latest Ref Range: 12 - 45 U/L 27   ALT(SGPT) Latest Ref Range: 2 - 50 U/L 51 (H)   Hepatitis panel unremarkable  Lifestyle modifications  Avoid ETOH

## 2020-09-14 ENCOUNTER — OFFICE VISIT (OUTPATIENT)
Dept: DERMATOLOGY | Facility: IMAGING CENTER | Age: 35
End: 2020-09-14
Payer: COMMERCIAL

## 2020-09-14 VITALS — HEIGHT: 73 IN | BODY MASS INDEX: 29.82 KG/M2 | TEMPERATURE: 98.1 F | WEIGHT: 225 LBS

## 2020-09-14 DIAGNOSIS — E55.9 VITAMIN D DEFICIENCY: ICD-10-CM

## 2020-09-14 DIAGNOSIS — R20.8 OTHER DISTURBANCES OF SKIN SENSATION: ICD-10-CM

## 2020-09-14 DIAGNOSIS — L91.8 ACROCHORDON: ICD-10-CM

## 2020-09-14 DIAGNOSIS — L94.2 DYSTROPHIC SCROTAL CALCINOSIS: ICD-10-CM

## 2020-09-14 PROCEDURE — 17110 DESTRUCTION B9 LES UP TO 14: CPT | Performed by: NURSE PRACTITIONER

## 2020-09-14 PROCEDURE — 99203 OFFICE O/P NEW LOW 30 MIN: CPT | Mod: 25 | Performed by: NURSE PRACTITIONER

## 2020-09-14 RX ORDER — ERGOCALCIFEROL 1.25 MG/1
CAPSULE ORAL
Qty: 12 CAP | Refills: 0 | OUTPATIENT
Start: 2020-09-14

## 2020-09-14 ASSESSMENT — ENCOUNTER SYMPTOMS
FEVER: 0
DIAPHORESIS: 0
WEIGHT LOSS: 0
CHILLS: 0

## 2020-09-14 ASSESSMENT — FIBROSIS 4 INDEX: FIB4 SCORE: 0.61

## 2020-09-14 NOTE — PROGRESS NOTES
Dermatology New Patient Visit    Chief Complaint   Patient presents with   • New Patient     cyst on testicle        Subjective:     HPI:   Franco Meléndez is a 34 y.o. male presenting for    HPI/location: multiple nodules on scrotum   Time present: come and go  Painful lesion: No  Itching lesion: Yes  Enlarging lesion: Yes  Anything make it better or worse? Getting them cut off.  Has had removed 3 previous times by dermatology.     Pt states that he has a skin tag on his scrotum that is bothersome and itching.     History of skin cancer: No  History of precancers/actinic keratoses: No  History of biopsies:Yes, Details: Yes, numerous times ([place], [place]) everything was normal that he knows of  History of blistering/severe sunburns:No  Family history of skin cancer:No  Family history of atypical moles:No        Past Medical History:   Diagnosis Date   • Colon polyps        Current Outpatient Medications on File Prior to Visit   Medication Sig Dispense Refill   • vitamin D, Ergocalciferol, (DRISDOL) 1.25 MG (72149 UT) Cap capsule Take 1 Cap by mouth every 7 days. 12 Cap 0   • traZODone (DESYREL) 50 MG Tab Take 1-2 Tabs by mouth at bedtime as needed for Sleep. 30 Tab 1     No current facility-administered medications on file prior to visit.        No Known Allergies    Family History   Problem Relation Age of Onset   • Cancer Other    • No Known Problems Mother    • No Known Problems Father    • No Known Problems Maternal Grandmother    • No Known Problems Maternal Grandfather    • Cancer Paternal Grandfather         lung cancer, smoking       Social History     Socioeconomic History   • Marital status:      Spouse name: Not on file   • Number of children: Not on file   • Years of education: Not on file   • Highest education level: Not on file   Occupational History   • Not on file   Social Needs   • Financial resource strain: Not on file   • Food insecurity     Worry: Not on file     Inability: Not on  "file   • Transportation needs     Medical: Not on file     Non-medical: Not on file   Tobacco Use   • Smoking status: Never Smoker   • Smokeless tobacco: Never Used   Substance and Sexual Activity   • Alcohol use: Never     Frequency: Never     Comment: occ   • Drug use: No   • Sexual activity: Yes     Partners: Female   Lifestyle   • Physical activity     Days per week: 7 days     Minutes per session: 30 min   • Stress: Not on file   Relationships   • Social connections     Talks on phone: Not on file     Gets together: Not on file     Attends Scientologist service: Not on file     Active member of club or organization: Not on file     Attends meetings of clubs or organizations: Not on file     Relationship status: Not on file   • Intimate partner violence     Fear of current or ex partner: Not on file     Emotionally abused: Not on file     Physically abused: Not on file     Forced sexual activity: Not on file   Other Topics Concern   • Not on file   Social History Narrative   • Not on file       Review of Systems   Constitutional: Negative for chills, diaphoresis, fever, malaise/fatigue and weight loss.   Skin: Negative for itching and rash.        Objective:     A focused cutaneous exam was completed including: external genitalia with the following pertinent findings listed below. Remaining above-listed examined areas within normal limits / negative for rashes or lesions.      Temp 36.7 °C (98.1 °F)   Ht 1.854 m (6' 1\")   Wt 102.1 kg (225 lb)     Physical Exam   Constitutional: He is oriented to person, place, and time and well-developed, well-nourished, and in no distress. No distress.   HENT:   Head: Normocephalic.   Pulmonary/Chest: Effort normal.   Genitourinary:    Genitourinary Comments: Multiple painless scrotal nodules.     One 2mm hyperpigmented, soft, pedunculated papule to scrotum        Neurological: He is alert and oriented to person, place, and time.   Skin: Skin is warm and dry. No rash noted. No " erythema.   Psychiatric: Mood normal.       DATA: none applicable to review    Assessment and Plan:     1. Dystrophic scrotal calcinosis  Plan for removal of nodules with Dr. Zee  Prior auth started     2. Acrochordon to scrotum  +itching  CRYOTHERAPY:  Risks (including, but not limited to: hypo or hyperpigmentation, redness, blister, blood blister, recurrence, need for further treatment, infection, scar) and benefits of cryotherapy discussed. Patient verbally agreed to proceed with treatment. 3 cryotherapy freeze thaw cycles of 10 seconds were applied to 1 lesion on scrotum with cryac. Patient tolerated procedure well. Aftercare instructions given.        3. Other disturbances of skin sensation  R/t to acrochordon. See plan above      Followup: Return for in near future with Dr. Zee or sooner for any concerns.    TORI Cain.

## 2020-09-14 NOTE — TELEPHONE ENCOUNTER
Received request via: Pharmacy    Was the patient seen in the last year in this department? Yes    Does the patient have an active prescription (recently filled or refills available) for medication(s) requested? Scheduled to run out 9/22

## 2020-09-14 NOTE — PROGRESS NOTES
Dermatology {kmvisittype:87882} Patient Visit    Chief Complaint   Patient presents with   • New Patient     cyst on testicle        Subjective:     HPI:   Franco Meléndez is a 34 y.o. male presenting for    HPI    Past Medical History:   Diagnosis Date   • Colon polyps        Current Outpatient Medications on File Prior to Visit   Medication Sig Dispense Refill   • vitamin D, Ergocalciferol, (DRISDOL) 1.25 MG (78364 UT) Cap capsule Take 1 Cap by mouth every 7 days. 12 Cap 0   • traZODone (DESYREL) 50 MG Tab Take 1-2 Tabs by mouth at bedtime as needed for Sleep. 30 Tab 1     No current facility-administered medications on file prior to visit.        No Known Allergies    Family History   Problem Relation Age of Onset   • Cancer Other    • No Known Problems Mother    • No Known Problems Father    • No Known Problems Maternal Grandmother    • No Known Problems Maternal Grandfather    • Cancer Paternal Grandfather         lung cancer, smoking       Social History     Socioeconomic History   • Marital status:      Spouse name: Not on file   • Number of children: Not on file   • Years of education: Not on file   • Highest education level: Not on file   Occupational History   • Not on file   Social Needs   • Financial resource strain: Not on file   • Food insecurity     Worry: Not on file     Inability: Not on file   • Transportation needs     Medical: Not on file     Non-medical: Not on file   Tobacco Use   • Smoking status: Never Smoker   • Smokeless tobacco: Never Used   Substance and Sexual Activity   • Alcohol use: Never     Frequency: Never     Comment: occ   • Drug use: No   • Sexual activity: Yes     Partners: Female   Lifestyle   • Physical activity     Days per week: 7 days     Minutes per session: 30 min   • Stress: Not on file   Relationships   • Social connections     Talks on phone: Not on file     Gets together: Not on file     Attends Islam service: Not on file     Active member of club or  "organization: Not on file     Attends meetings of clubs or organizations: Not on file     Relationship status: Not on file   • Intimate partner violence     Fear of current or ex partner: Not on file     Emotionally abused: Not on file     Physically abused: Not on file     Forced sexual activity: Not on file   Other Topics Concern   • Not on file   Social History Narrative   • Not on file       ROS     Objective:     A {kmskinexamfullfocused:20740} exam was completed including: scalp, hair, ears, face, eyelids, conjunctiva, lips, gums/tongue/oropharynx***, neck, {KMTSECB:20696}, abdomen, back, left and right upper extremities (including {KMTSEhands:34095}), left and right lower extremities (including {kmtsefeet:20698}), buttocks, {kmtsegenitalia:20699} external genitalia (patient refusal***) with the following pertinent findings listed below. Remaining above-listed examined areas within normal limits / negative for rashes or lesions.    Temp 36.7 °C (98.1 °F)   Ht 1.854 m (6' 1\")   Wt 102.1 kg (225 lb)     Physical Exam    DATA: none applicable to review***    Assessment and Plan:     There are no diagnoses linked to this encounter.    Followup: No follow-ups on file.    Suhail Lakhani, Med Ass't        "

## 2020-09-14 NOTE — TELEPHONE ENCOUNTER
Called patient and relayed Dr. Gauthier's instructions to switch to OTC Vitamin D3 supplement, 1000 IU daily. Patient understood, and already has purchased the supplement.

## 2020-09-14 NOTE — PROGRESS NOTES
"Subjective:      Franco Meléndez is a 34 y.o. male who presents with New Patient (cyst on testicle )            HPI/location: multiple cysts on both testicles  Time present: come and go  Painful lesion: No  Itching lesion: Yes  Enlarging lesion: Yes  Anything make it better or worse? Getting them cut off.    History of skin cancer: No  History of precancers/actinic keratoses: No  History of biopsies:Yes, Details: Yes, numerous times ([place], [place]) everything was normal that he knows of  History of blistering/severe sunburns:No  Family history of skin cancer:No  Family history of atypical moles:No            Review of Systems   Constitutional: Negative for chills, diaphoresis, fever, malaise/fatigue and weight loss.   Skin: Negative for itching and rash.          Objective:     Temp 36.7 °C (98.1 °F)   Ht 1.854 m (6' 1\")   Wt 102.1 kg (225 lb)   BMI 29.69 kg/m²      Physical Exam  Constitutional:       Appearance: Normal appearance.                 Assessment/Plan:        There are no diagnoses linked to this encounter.    "

## 2020-09-28 ENCOUNTER — OFFICE VISIT (OUTPATIENT)
Dept: DERMATOLOGY | Facility: IMAGING CENTER | Age: 35
End: 2020-09-28
Payer: COMMERCIAL

## 2020-09-28 VITALS — BODY MASS INDEX: 16.83 KG/M2 | HEIGHT: 73 IN | WEIGHT: 127 LBS | TEMPERATURE: 97.2 F

## 2020-09-28 DIAGNOSIS — L94.2 DYSTROPHIC SCROTAL CALCINOSIS: ICD-10-CM

## 2020-09-28 DIAGNOSIS — R20.8 OTHER DISTURBANCES OF SKIN SENSATION: ICD-10-CM

## 2020-09-28 PROCEDURE — 11421 EXC H-F-NK-SP B9+MARG 0.6-1: CPT | Performed by: DERMATOLOGY

## 2020-09-28 PROCEDURE — 11420 EXC H-F-NK-SP B9+MARG 0.5/<: CPT | Mod: 59 | Performed by: DERMATOLOGY

## 2020-09-28 ASSESSMENT — FIBROSIS 4 INDEX: FIB4 SCORE: 0.61

## 2020-09-28 NOTE — PROGRESS NOTES
Dermatology  Benign Removal Operative Note    Franco Meléndez is 34 y.o. male who returns for benign removal of several scrotal calcifications.     Focused exam: right scrotum (7 mm), midline scrotum x3 (3-4 mm), left scrotum (4mm) white firm nodules    1. Dystrophic scrotal calcinosis, + pruritus  - Removal Procedure Note: Location: A. Right scrotum; B. Left scrotum; C. Midline scrotum (3 pieces)  - Indication: R20.8 (pruritus)  -Total specimens sent for pathology: 3  - Photo taken with patient permission (see media tab)  - R/O scrotal calcinosis     Punch removal x 3  After informed, written consent regarding the risks of scar, bleeding infection, numbness/nerve damage and discoloration was obtained and site confirmation with the patient, the site was cleaned with Hibiclens and infiltrated with 1% Lidocaine with epinephrine anesthesia.  A 8.0 mm (right scrotum); 4.0 mm (midline scrotum); 5.0 mm (left scrotum) punch biopsy was performed and hemostasis was achieved with 4.0 and 5.0 prolene suture.  The patient tolerated the procedure without incident.  Topical vaseline and a wound dressing was applied.  Wound care instructions were verbally discussed and a handout provided.   The specimen was labeled and sent to pathology for evaluation.  Suture to be removed in 7-10 days.     Complications: None     Return to clinic pending pathology and as scheduled.    Kayla Zee M.D.

## 2020-10-02 ENCOUNTER — TELEPHONE (OUTPATIENT)
Dept: DERMATOLOGY | Facility: IMAGING CENTER | Age: 35
End: 2020-10-02

## 2020-10-02 NOTE — TELEPHONE ENCOUNTER
Patient notified of D-Path results , all benign no further Treatment is needed . Scanned in media tab

## 2020-10-05 ENCOUNTER — NON-PROVIDER VISIT (OUTPATIENT)
Dept: DERMATOLOGY | Facility: IMAGING CENTER | Age: 35
End: 2020-10-05
Payer: COMMERCIAL

## 2020-10-05 ENCOUNTER — OFFICE VISIT (OUTPATIENT)
Dept: DERMATOLOGY | Facility: IMAGING CENTER | Age: 35
End: 2020-10-05
Payer: COMMERCIAL

## 2020-10-05 DIAGNOSIS — D22.9 MULTIPLE NEVI: ICD-10-CM

## 2020-10-05 DIAGNOSIS — L91.8 ACROCHORDON: ICD-10-CM

## 2020-10-05 DIAGNOSIS — Z12.83 SKIN CANCER SCREENING: ICD-10-CM

## 2020-10-05 PROCEDURE — 99214 OFFICE O/P EST MOD 30 MIN: CPT | Performed by: NURSE PRACTITIONER

## 2020-10-05 ASSESSMENT — ENCOUNTER SYMPTOMS
CHILLS: 0
DIAPHORESIS: 0
WEIGHT LOSS: 0
FEVER: 0

## 2020-10-05 NOTE — NON-PROVIDER
Franco Meléndez is a 34 y.o. male here for a Non-Provider Visit for Suture Removal.    Sutures were placed by Dr. Zee on date: 9/28/20  Skin is healed: Yes  Provider notified if skin is not healed, or if there is redness, heat, pain, or drainage from incision: N\A  Sutures removed.   Mastisol and steristips are placed: No    Advised to use emollient (vaseline, aquaphor, etc.) as needed, avoid peroxide and antibiotic ointment to reduce irritation.     Path report has been reviewed by provider.  Path report has reviewed with patient.

## 2020-10-05 NOTE — PROGRESS NOTES
Dermatology Return Patient Visit    Chief Complaint   Patient presents with   • Follow-Up     BELLE       Subjective:     HPI:   Franco Meléndez is a 34 y.o. male presenting for    Last skin exam 4 years ago      Pt states that he has a skin tag on his scrotum that is bothersome and itching-treated with cryo at last exam    History of skin cancer: No  History of precancers/actinic keratoses: No  History of biopsies:Yes, Details: Yes, numerous times ([place], [place]) everything was normal that he knows of  History of blistering/severe sunburns:No  Family history of skin cancer:No  Family history of atypical moles:No            Past Medical History:   Diagnosis Date   • Colon polyps        Current Outpatient Medications on File Prior to Visit   Medication Sig Dispense Refill   • vitamin D, Ergocalciferol, (DRISDOL) 1.25 MG (28106 UT) Cap capsule Take 1 Cap by mouth every 7 days. (Patient not taking: Reported on 10/5/2020) 12 Cap 0   • traZODone (DESYREL) 50 MG Tab Take 1-2 Tabs by mouth at bedtime as needed for Sleep. (Patient not taking: Reported on 10/5/2020) 30 Tab 1     No current facility-administered medications on file prior to visit.        No Known Allergies    Family History   Problem Relation Age of Onset   • Cancer Other    • No Known Problems Mother    • No Known Problems Father    • No Known Problems Maternal Grandmother    • No Known Problems Maternal Grandfather    • Cancer Paternal Grandfather         lung cancer, smoking       Social History     Socioeconomic History   • Marital status:      Spouse name: Not on file   • Number of children: Not on file   • Years of education: Not on file   • Highest education level: Not on file   Occupational History   • Not on file   Social Needs   • Financial resource strain: Not on file   • Food insecurity     Worry: Not on file     Inability: Not on file   • Transportation needs     Medical: Not on file     Non-medical: Not on file   Tobacco Use   •  Smoking status: Never Smoker   • Smokeless tobacco: Never Used   Substance and Sexual Activity   • Alcohol use: Never     Frequency: Never     Comment: occ   • Drug use: No   • Sexual activity: Yes     Partners: Female   Lifestyle   • Physical activity     Days per week: 7 days     Minutes per session: 30 min   • Stress: Not on file   Relationships   • Social connections     Talks on phone: Not on file     Gets together: Not on file     Attends Voodoo service: Not on file     Active member of club or organization: Not on file     Attends meetings of clubs or organizations: Not on file     Relationship status: Not on file   • Intimate partner violence     Fear of current or ex partner: Not on file     Emotionally abused: Not on file     Physically abused: Not on file     Forced sexual activity: Not on file   Other Topics Concern   • Not on file   Social History Narrative   • Not on file       Review of Systems   Constitutional: Negative for chills, diaphoresis, fever, malaise/fatigue and weight loss.   Skin: Negative for itching and rash.        Objective:     A focused cutaneous exam was completed including: external genitalia with the following pertinent findings listed below. Remaining above-listed examined areas within normal limits / negative for rashes or lesions.      There were no vitals taken for this visit.    Physical Exam   Constitutional: He is oriented to person, place, and time and well-developed, well-nourished, and in no distress. No distress.   HENT:   Head: Normocephalic.   Right Ear: External ear normal.   Left Ear: External ear normal.   Mouth/Throat: Oropharynx is clear and moist.   Eyes: Conjunctivae are normal.   Neck: Neck supple.   Pulmonary/Chest: Effort normal.   Genitourinary:    Genitourinary Comments: Multiple painless scrotal nodules.     One 2mm hyperpigmented, soft, pedunculated papule to scrotum        Neurological: He is alert and oriented to person, place, and time.   Skin:  Skin is warm and dry. No rash noted. No erythema.   Multiple tan light brown medium brown dark brown macules papules scattered over the face, trunk >> extremities. All with benign-appearing pigment network patterns on dermoscopy      1mm skin-colored to hyperpigmented, soft, pedunculated papule at scrotum.      Psychiatric: Mood and affect normal.   Vitals reviewed.      DATA: none applicable to review    Assessment and Plan:     1. Multiple nevi  - Benign-appearing nature of lesions discussed. Advised to return to clinic for any new or concerning changes.  - ABCDE's of melanoma discussed      2. Acrochordon at scrotum-previously cryo treated.   Educated patient about diagnosis, management options, and expectations of treatment.    Skin tag was snipped off using clorhexadine for cleansing and sterile iris scissors. Local anesthesia was not used. The pathognomonic lesion was not sent for pathology. Pt tolerated procedure well.       3. Skin cancer screening  Skin cancer education  - discussed importance of sun protective clothing, eyewear  - discussed importance of daily use of broad spectrum sunscreen with SPF 30 or greater, as well as need for reapplication ~every 2 hours when exposed to UVR  - discussed importance of regular self-exams, ideally once per month, every 12 months exams in clinic  - ABCDE's of melanoma discussed  - patient to bring any new or concerning lesions to my attention  - Patient educational handout provided and reviewed with patient      Followup: Return in about 1 year (around 10/5/2021) for BELLE or sooner for any concerns.    TORI Cain.

## 2020-11-10 ENCOUNTER — OFFICE VISIT (OUTPATIENT)
Dept: URGENT CARE | Facility: CLINIC | Age: 35
End: 2020-11-10
Payer: COMMERCIAL

## 2020-11-10 VITALS
SYSTOLIC BLOOD PRESSURE: 102 MMHG | HEART RATE: 74 BPM | TEMPERATURE: 97.9 F | BODY MASS INDEX: 29.16 KG/M2 | RESPIRATION RATE: 16 BRPM | WEIGHT: 220 LBS | HEIGHT: 73 IN | OXYGEN SATURATION: 97 % | DIASTOLIC BLOOD PRESSURE: 80 MMHG

## 2020-11-10 DIAGNOSIS — R05.9 COUGH: ICD-10-CM

## 2020-11-10 DIAGNOSIS — Z20.822 SUSPECTED COVID-19 VIRUS INFECTION: ICD-10-CM

## 2020-11-10 LAB
FLUAV+FLUBV AG SPEC QL IA: NEGATIVE
INT CON NEG: NORMAL
INT CON POS: NORMAL

## 2020-11-10 PROCEDURE — 99214 OFFICE O/P EST MOD 30 MIN: CPT | Mod: CS | Performed by: PHYSICIAN ASSISTANT

## 2020-11-10 PROCEDURE — 87804 INFLUENZA ASSAY W/OPTIC: CPT | Performed by: PHYSICIAN ASSISTANT

## 2020-11-10 ASSESSMENT — ENCOUNTER SYMPTOMS
EYE DISCHARGE: 0
HEADACHES: 1
COUGH: 1
EYE REDNESS: 0
VOMITING: 0
NAUSEA: 0
SORE THROAT: 0
DIARRHEA: 0
WHEEZING: 0
MYALGIAS: 1

## 2020-11-10 ASSESSMENT — FIBROSIS 4 INDEX: FIB4 SCORE: 0.61

## 2020-11-11 NOTE — PROGRESS NOTES
Subjective:      Franco Meléndez is a 34 y.o. male who presents with Fever, Body Aches, Headache, and Cough (x2 days )            Cough  This is a new problem. Episode onset: x 1 day ago. The problem has been unchanged. The cough is non-productive. Associated symptoms include a fever (The patient reports an associated intermittent fever with a max temp of 100.), headaches, myalgias, nasal congestion and shortness of breath (The patient reports intermittent shortness of breath, now improved.). Pertinent negatives include no chest pain, ear pain, eye redness, rash, sore throat or wheezing. He has tried nothing for the symptoms.     The patient reports no known exposure to COVID-19.  He reports no additional sick contacts.    PMH:  has a past medical history of Colon polyps.  MEDS:   Current Outpatient Medications:   •  vitamin D, Ergocalciferol, (DRISDOL) 1.25 MG (50633 UT) Cap capsule, Take 1 Cap by mouth every 7 days. (Patient not taking: Reported on 10/5/2020), Disp: 12 Cap, Rfl: 0  •  traZODone (DESYREL) 50 MG Tab, Take 1-2 Tabs by mouth at bedtime as needed for Sleep. (Patient not taking: Reported on 10/5/2020), Disp: 30 Tab, Rfl: 1  ALLERGIES: No Known Allergies  SURGHX:   Past Surgical History:   Procedure Laterality Date   • HERNIA REPAIR      left side with mesh   • TONSILLECTOMY AND ADENOIDECTOMY       SOCHX:  reports that he has never smoked. He has never used smokeless tobacco. He reports previous alcohol use. He reports that he does not use drugs.  FH: Family history was reviewed, no pertinent findings to report      Review of Systems   Constitutional: Positive for fever (The patient reports an associated intermittent fever with a max temp of 100.).   HENT: Negative for congestion, ear pain and sore throat.    Eyes: Negative for discharge and redness.   Respiratory: Positive for cough and shortness of breath (The patient reports intermittent shortness of breath, now improved.). Negative for wheezing.   "  Cardiovascular: Negative for chest pain and leg swelling.   Gastrointestinal: Negative for diarrhea, nausea and vomiting.   Musculoskeletal: Positive for myalgias.   Skin: Negative for rash.   Neurological: Positive for headaches.          Objective:     /80   Pulse 74   Temp 36.6 °C (97.9 °F) (Temporal)   Resp 16   Ht 1.854 m (6' 1\")   Wt 99.8 kg (220 lb)   SpO2 97%   BMI 29.03 kg/m²      Physical Exam  Constitutional:       General: He is not in acute distress.     Appearance: Normal appearance. He is not ill-appearing.   HENT:      Head: Normocephalic and atraumatic.      Right Ear: External ear normal.      Left Ear: External ear normal.      Nose: Nose normal.      Mouth/Throat:      Mouth: Mucous membranes are moist.      Pharynx: Oropharynx is clear. No posterior oropharyngeal erythema.   Eyes:      Extraocular Movements: Extraocular movements intact.      Conjunctiva/sclera: Conjunctivae normal.   Neck:      Musculoskeletal: Normal range of motion and neck supple.   Cardiovascular:      Rate and Rhythm: Normal rate and regular rhythm.      Heart sounds: Normal heart sounds.   Pulmonary:      Effort: Pulmonary effort is normal. No respiratory distress.      Breath sounds: Normal breath sounds. No wheezing.   Musculoskeletal: Normal range of motion.   Skin:     General: Skin is warm and dry.   Neurological:      Mental Status: He is alert and oriented to person, place, and time.            Progress:  POCT Rapid Flu: Negative    COVID-19 PCR - pending        Assessment/Plan:        1. Cough  - POCT Influenza A/B    2. Suspected COVID-19 virus infection  - COVID/SARS COV-2 PCR; Future    The patient's presenting symptoms and physical exam findings are consistent with a cough.  The patient's physical exam today in clinic was normal.  The patient's lungs were clear to auscultation without wheezing, and his pulse ox was within normal limits.  The patient appears in no acute distress.  The patient's " vital signs are stable and within normal limits.  He is afebrile today in clinic.  The patient's POCT rapid flu test today in clinic was negative.  The patient is concerned about possible COVID-19.  Advised the patient his COVID-19 test would likely be inaccurate at this time given the short duration of his symptoms.  Will order the patient a COVID-19 test to be done after symptoms have been present for 72 hours.  Advised the patient to stay at home under self quarantine while waiting to be tested and thereafter while awaiting the test results.  Provided the patient with home isolation and self quarantine instructions, as well as a work note.  Recommend OTC medications and supportive care for symptomatic management.  Recommend the patient follow-up with his PCP.  Discussed return precautions with the patient, and he verbalized understanding.    Differential diagnoses, supportive care, and indications for immediate follow-up discussed with patient.   Instructed to return to clinic or nearest emergency department for any change in condition, further concerns, or worsening of symptoms.    OTC Tylenol or Motrin for fever/discomfort.  OTC cough/cold medication for symptomatic relief  OTC Supportive Care for Congestion - saline nasal spray or neti pot  Drink plenty of fluids  Advised the patient to stay at home under self-isolation until symptoms have been present for at least 10 days and are improved, and there has been no fever for at least 72 hours without the use of medications and/or no vomiting or diarrhea for 48 hours.  --Provided the patient with home isolation and self quarantine instructions  Work note provided  Follow-up with PCP  Return to clinic or go to the ED if symptoms worsen or fail to improve, or if the patient should develop worsening/increasing cough, congestion, ear pain, sore throat, shortness of breath, wheezing, chest pain, fever/chills, and/or any concerning symptoms.    Discussed plan with the  patient, and he agrees to the above.    Please note that this dictation was created using voice recognition software. I have made every reasonable attempt to correct obvious errors, but I expect that there may be errors of grammar and possibly content that I did not discover before finalizing the note.

## 2020-11-11 NOTE — PATIENT INSTRUCTIONS
INSTRUCTIONS FOR COVID-19 OR ANY OTHER INFECTIOUS RESPIRATORY ILLNESSES    The Centers for Disease Control and Prevention (CDC) states that early indications for COVID-19 include cough, shortness of breath, difficulty breathing, or at least two of the following symptoms: chills, shaking with chills, muscle pain, headache, sore throat, and loss of taste or smell. Symptoms can range from mild to severe and may appear up to two weeks after exposure to the virus.    The practice of self-isolation and quarantine helps protect the public and your family by  preventing exposure to people who have or may have a contagious disease. Please follow the prevention steps below as based on CDC guidelines:    WHEN TO STOP ISOLATION: Persons with COVID-19 or any other infectious respiratory illness who have symptoms and were advised to care for themselves at home may discontinue home isolation under the following conditions:  · At least 24 hours have passed since recovery defined as resolution of fever without the use of fever-reducing medications; AND,  · Improvement in respiratory symptoms (e.g., cough, shortness of breath); AND,  · At least 10 days have passed since symptoms first appeared and have had no subsequent illness.    MONITOR YOUR SYMPTOMS: If your illness is worsening, seek prompt medical attention. If you have a medical emergency and need to call 911, notify the dispatch personnel that you have, or are being evaluated for confirmed or suspected COVID-19 or another infectious respiratory illness. Wear a facemask if possible.    ACTIVITY RESTRICTION: restrict activities outside your home, except for getting medical care. Do not go to work, school, or public areas. Avoid using public transportation, ride-sharing, or taxis.    SCHEDULED MEDICAL APPOINTMENTS: Notify your provider that you have, or are being evaluated for, confirmed or suspected COVID-19 or another infectious respiratory. This will help the healthcare  provider’s office safely take care of you and keep other people from getting exposed or infected.    FACEMASKS, when to wear: Anytime you are away from your home or around other people or pets. If you are unable to wear one, maintain a minimum of 6 feet distancing from others.    LIVING ENVIRONMENT: Stay in a separate room from other people and pets. If possible, use a separate bathroom, have someone else care for your pets and avoid sharing household items. Any items used should be washed thoroughly with soap and water. Clean all “high-touch” surfaces every day. Use a household cleaning spray or wipe, according to the label instructions. High touch surfaces include (but are not limited to) counters, tabletops, doorknobs, bathroom fixtures, toilets, phones, keyboards, tablets, and bedside tables.     HAND WASHING: Frequently wash hands with soap and water for at least 20 seconds,  especially after blowing your nose, coughing, or sneezing; going to the bathroom; before and after interacting with pets; and before and after eating or preparing food. If hands are visibly dirty use soap and water. If soap and water are not available, use an alcohol-based hand  with at least 60% alcohol. Avoid touching your eyes, nose, and mouth with unwashed hands. Cover your coughs and sneezes with a tissue. Throw used tissues in a lined trash can. Immediately wash your hands.    ACTIVE/FACILITATED SELF-MONITORING: Follow instructions provided by your local health department or health professionals, as appropriate. When working with your local health department check their available hours.    [facility]   Phone Number   [facility] [phone]   [facility] [place] [phone]   [place] Call [address]   [place] [phone]     IF YOU HAVE CONFIRMED POSITIVE COVID-19:    Those who have completely recovered from COVID-19 may have immune-boosting antibodies in their plasma--called “convalescent plasma”--that could be  used to treat critically ill COVID19 patients.    [facility] is excited to begin working with [facility] on collecting convalescent plasma from  people who have recovered from COVID-19 as part of a program to treat patients infected with the virus. This FDA-approved “emergency investigational new drug” is a special blood product containing antibodies that may give patients an extra boost to fight the virus.    To be eligible to donate convalescent plasma, you must have a prior COVID-19 diagnosis documented by a laboratory test (or a positive test result for SARS-CoV-2 antibodies) and meet additional eligibility requirements.    If you are interested in donating convalescent plasma or have any additional questions, please contact the [facility] Convalescent Plasma  at [phone] or via e-mail at [email or web address].

## 2020-11-12 ENCOUNTER — HOSPITAL ENCOUNTER (OUTPATIENT)
Dept: LAB | Facility: MEDICAL CENTER | Age: 35
End: 2020-11-12
Attending: PHYSICIAN ASSISTANT
Payer: COMMERCIAL

## 2020-11-12 DIAGNOSIS — Z20.822 SUSPECTED COVID-19 VIRUS INFECTION: ICD-10-CM

## 2020-11-12 PROCEDURE — C9803 HOPD COVID-19 SPEC COLLECT: HCPCS

## 2020-11-12 PROCEDURE — U0003 INFECTIOUS AGENT DETECTION BY NUCLEIC ACID (DNA OR RNA); SEVERE ACUTE RESPIRATORY SYNDROME CORONAVIRUS 2 (SARS-COV-2) (CORONAVIRUS DISEASE [COVID-19]), AMPLIFIED PROBE TECHNIQUE, MAKING USE OF HIGH THROUGHPUT TECHNOLOGIES AS DESCRIBED BY CMS-2020-01-R: HCPCS

## 2020-11-12 ASSESSMENT — ENCOUNTER SYMPTOMS
SHORTNESS OF BREATH: 1
FEVER: 1

## 2020-11-13 LAB
COVID ORDER STATUS COVID19: NORMAL
SARS-COV-2 RNA RESP QL NAA+PROBE: DETECTED
SPECIMEN SOURCE: ABNORMAL

## 2021-02-18 ENCOUNTER — HOSPITAL ENCOUNTER (OUTPATIENT)
Dept: RADIOLOGY | Facility: MEDICAL CENTER | Age: 36
End: 2021-02-18
Attending: INTERNAL MEDICINE
Payer: COMMERCIAL

## 2021-02-18 DIAGNOSIS — E83.119 HEMOCHROMATOSIS, UNSPECIFIED HEMOCHROMATOSIS TYPE: ICD-10-CM

## 2021-02-18 DIAGNOSIS — R74.01 TRANSAMINITIS: ICD-10-CM

## 2021-02-18 PROCEDURE — 76705 ECHO EXAM OF ABDOMEN: CPT

## 2021-02-19 ENCOUNTER — HOSPITAL ENCOUNTER (OUTPATIENT)
Dept: LAB | Facility: MEDICAL CENTER | Age: 36
End: 2021-02-19
Attending: INTERNAL MEDICINE
Payer: COMMERCIAL

## 2021-02-19 DIAGNOSIS — R74.01 TRANSAMINITIS: ICD-10-CM

## 2021-02-19 DIAGNOSIS — E78.5 DYSLIPIDEMIA: ICD-10-CM

## 2021-02-19 DIAGNOSIS — E83.119 HEMOCHROMATOSIS, UNSPECIFIED HEMOCHROMATOSIS TYPE: ICD-10-CM

## 2021-02-19 LAB
ALBUMIN SERPL BCP-MCNC: 4.5 G/DL (ref 3.2–4.9)
ALBUMIN/GLOB SERPL: 1.6 G/DL
ALP SERPL-CCNC: 64 U/L (ref 30–99)
ALT SERPL-CCNC: 23 U/L (ref 2–50)
ANION GAP SERPL CALC-SCNC: 13 MMOL/L (ref 7–16)
AST SERPL-CCNC: 16 U/L (ref 12–45)
BASOPHILS # BLD AUTO: 0.9 % (ref 0–1.8)
BASOPHILS # BLD: 0.06 K/UL (ref 0–0.12)
BILIRUB SERPL-MCNC: 0.8 MG/DL (ref 0.1–1.5)
BUN SERPL-MCNC: 15 MG/DL (ref 8–22)
CALCIUM SERPL-MCNC: 9.1 MG/DL (ref 8.4–10.2)
CHLORIDE SERPL-SCNC: 103 MMOL/L (ref 96–112)
CHOLEST SERPL-MCNC: 202 MG/DL (ref 100–199)
CO2 SERPL-SCNC: 22 MMOL/L (ref 20–33)
CREAT SERPL-MCNC: 1.12 MG/DL (ref 0.5–1.4)
EOSINOPHIL # BLD AUTO: 0.1 K/UL (ref 0–0.51)
EOSINOPHIL NFR BLD: 1.4 % (ref 0–6.9)
ERYTHROCYTE [DISTWIDTH] IN BLOOD BY AUTOMATED COUNT: 38.5 FL (ref 35.9–50)
FASTING STATUS PATIENT QL REPORTED: NORMAL
FERRITIN SERPL-MCNC: 616 NG/ML (ref 22–322)
GLOBULIN SER CALC-MCNC: 2.9 G/DL (ref 1.9–3.5)
GLUCOSE SERPL-MCNC: 91 MG/DL (ref 65–99)
HCT VFR BLD AUTO: 47.4 % (ref 42–52)
HDLC SERPL-MCNC: 51 MG/DL
HGB BLD-MCNC: 17.1 G/DL (ref 14–18)
IMM GRANULOCYTES # BLD AUTO: 0.01 K/UL (ref 0–0.11)
IMM GRANULOCYTES NFR BLD AUTO: 0.1 % (ref 0–0.9)
IRON SATN MFR SERPL: 53 % (ref 15–55)
IRON SERPL-MCNC: 139 UG/DL (ref 50–180)
LDLC SERPL CALC-MCNC: 111 MG/DL
LYMPHOCYTES # BLD AUTO: 2.88 K/UL (ref 1–4.8)
LYMPHOCYTES NFR BLD: 41.6 % (ref 22–41)
MCH RBC QN AUTO: 31.3 PG (ref 27–33)
MCHC RBC AUTO-ENTMCNC: 36.1 G/DL (ref 33.7–35.3)
MCV RBC AUTO: 86.8 FL (ref 81.4–97.8)
MONOCYTES # BLD AUTO: 0.76 K/UL (ref 0–0.85)
MONOCYTES NFR BLD AUTO: 11 % (ref 0–13.4)
NEUTROPHILS # BLD AUTO: 3.12 K/UL (ref 1.82–7.42)
NEUTROPHILS NFR BLD: 45 % (ref 44–72)
NRBC # BLD AUTO: 0 K/UL
NRBC BLD-RTO: 0 /100 WBC
PLATELET # BLD AUTO: 211 K/UL (ref 164–446)
PMV BLD AUTO: 11.3 FL (ref 9–12.9)
POTASSIUM SERPL-SCNC: 4.1 MMOL/L (ref 3.6–5.5)
PROT SERPL-MCNC: 7.4 G/DL (ref 6–8.2)
RBC # BLD AUTO: 5.46 M/UL (ref 4.7–6.1)
SODIUM SERPL-SCNC: 138 MMOL/L (ref 135–145)
TIBC SERPL-MCNC: 260 UG/DL (ref 250–450)
TRIGL SERPL-MCNC: 201 MG/DL (ref 0–149)
UIBC SERPL-MCNC: 121 UG/DL (ref 110–370)
WBC # BLD AUTO: 6.9 K/UL (ref 4.8–10.8)

## 2021-02-19 PROCEDURE — 80053 COMPREHEN METABOLIC PANEL: CPT

## 2021-02-19 PROCEDURE — 82728 ASSAY OF FERRITIN: CPT

## 2021-02-19 PROCEDURE — 83540 ASSAY OF IRON: CPT

## 2021-02-19 PROCEDURE — 85025 COMPLETE CBC W/AUTO DIFF WBC: CPT

## 2021-02-19 PROCEDURE — 83550 IRON BINDING TEST: CPT

## 2021-02-19 PROCEDURE — 80061 LIPID PANEL: CPT

## 2021-02-19 PROCEDURE — 36415 COLL VENOUS BLD VENIPUNCTURE: CPT

## 2021-02-19 PROCEDURE — 81256 HFE GENE: CPT

## 2021-03-04 ENCOUNTER — OFFICE VISIT (OUTPATIENT)
Dept: MEDICAL GROUP | Facility: MEDICAL CENTER | Age: 36
End: 2021-03-04
Payer: COMMERCIAL

## 2021-03-04 VITALS
RESPIRATION RATE: 18 BRPM | WEIGHT: 238.1 LBS | BODY MASS INDEX: 31.56 KG/M2 | HEIGHT: 73 IN | SYSTOLIC BLOOD PRESSURE: 108 MMHG | TEMPERATURE: 98.3 F | HEART RATE: 78 BPM | OXYGEN SATURATION: 96 % | DIASTOLIC BLOOD PRESSURE: 72 MMHG

## 2021-03-04 DIAGNOSIS — E83.119 HEMOCHROMATOSIS, UNSPECIFIED HEMOCHROMATOSIS TYPE: ICD-10-CM

## 2021-03-04 DIAGNOSIS — K76.0 FATTY LIVER: ICD-10-CM

## 2021-03-04 DIAGNOSIS — E78.5 DYSLIPIDEMIA: ICD-10-CM

## 2021-03-04 DIAGNOSIS — E55.9 VITAMIN D DEFICIENCY: ICD-10-CM

## 2021-03-04 PROCEDURE — 99214 OFFICE O/P EST MOD 30 MIN: CPT | Performed by: INTERNAL MEDICINE

## 2021-03-04 RX ORDER — VITAMIN B COMPLEX
2000 TABLET ORAL DAILY
COMMUNITY
End: 2022-02-23

## 2021-03-04 ASSESSMENT — FIBROSIS 4 INDEX: FIB4 SCORE: 0.54

## 2021-03-04 NOTE — PATIENT INSTRUCTIONS
Please check out Www.[email or web address].org-> health topics-> cholesterol  Read about the articles and see what you could do to improve your lifestyle.

## 2021-03-05 NOTE — PROGRESS NOTES
Established Patient    Franco Meléndez is a 35 y.o. male who presents today with the following:    CC:   Chief Complaint   Patient presents with   • Lab Results       HPI:     Hemochromatosis  H63D heterozygous mutation     Ref. Range 6/29/2020 11:09 11/12/2020 08:35 2/19/2021 07:57   Ferritin Latest Ref Range: 22.0 - 322.0 ng/mL 707.0 (H)  616.0 (H)      Ref. Range 6/29/2020 11:09   C282Y Mutation Unknown Negative   H63D Mutation Unknown Heterozygous   S65C Mutation Unknown Negative   Hemochrom Interp Unknown See Note      Ref. Range 6/22/2020 10:15 6/29/2020 11:09 2/19/2021 07:57   AST(SGOT) Latest Ref Range: 12 - 45 U/L 27  16   ALT(SGPT) Latest Ref Range: 2 - 50 U/L 51 (H)  23   Alkaline Phosphatase Latest Ref Range: 30 - 99 U/L 62  64   Total Bilirubin Latest Ref Range: 0.1 - 1.5 mg/dL 0.8  0.8   Albumin Latest Ref Range: 3.2 - 4.9 g/dL 4.7  4.5   Total Protein Latest Ref Range: 6.0 - 8.2 g/dL 7.7  7.4   Globulin Latest Ref Range: 1.9 - 3.5 g/dL 3.0  2.9   A-G Ratio Latest Units: g/dL 1.6  1.6        Ref. Range 6/29/2020 11:09 2/19/2021 07:57   Iron Latest Ref Range: 50 - 180 ug/dL 112 139   Total Iron Binding Latest Ref Range: 250 - 450 ug/dL 280 260   % Saturation Latest Ref Range: 15 - 55 % 40 53   Unsat Iron Binding Latest Ref Range: 110 - 370 ug/dL 168 121      Ref. Range 6/29/2020 11:09   Hep B Surface Antibody Quant Latest Ref Range: 0.00 - 10.00 mIU/mL 14.90 (H)   Hepatitis B Surface Antigen Latest Ref Range: Non-Reactive  Non-Reactive   Hepatitis B Core Ab, Total Latest Ref Range: Non-Reactive  NonReactive   Hepatitis C Antibody Latest Ref Range: Non-Reactive  Non-Reactive     7/2020 US liver  1.  Enlarged liver with subtotal diffuse fatty infiltration appearance. Focal area of sparing is near the gallbladder fossa.  2.  Normal appearance of gallbladder and biliary tree.  3.  Otherwise negative right upper quadrant ultrasound.    2/2021 US liver  1.  Increase in hepatic echogenicity which could be  "hepatic steatosis, fibrosis, or iron deposition  2.  Mild hepatomegaly  3.  Incidental right renal cyst    Monitor iron storage.  Monitor Liver US +/- AFP Q 6 months  Lifestyle modifications for fatty liver, impaired fasting glucose  No chest pain, palpitation, SOB  Avoid iron supplement, vit C, uncooked seafood and ETOH  Referral to hematology        Current Outpatient Medications   Medication Sig Dispense Refill   • vitamin D (CHOLECALCIFEROL) 1000 Unit (25 mcg) Tab Take 2,000 Units by mouth every day.       No current facility-administered medications for this visit.       Allergies, past medical history, past surgical history, medications, family history, social history reviewed and updated.    ROS   Constitutional: Denies fevers or chills  Eyes: Denies changes in vision  Ears/Nose/Throat/Mouth: Denies nasal congestion or sore throat   Cardiovascular: Denies chest pain or palpitations   Respiratory: Denies shortness of breath , Denies cough  Gastrointestinal/Hepatic: Denies abd pain, nausea, vomiting   Genitourinary: Denies dysuria or frequency  Musculoskeletal/Rheum: Denies joint pain and swelling   Neurological: Denies headache  Psychiatric: Denies mood disorder   Endocrine: Denies hx of diabetes or thyroid dysfunction  Heme/Oncology/Lymph Nodes: Denies weight changes or enlarged LNs.    Physical Exam  Vitals: /72 (BP Location: Left arm, Patient Position: Sitting, BP Cuff Size: Adult)   Pulse 78   Temp 36.8 °C (98.3 °F) (Temporal)   Resp 18   Ht 1.854 m (6' 1\")   Wt 108 kg (238 lb 1.6 oz)   SpO2 96%   BMI 31.41 kg/m²   General: Alert, pleasant, NAD  HEENT: Normocephalic.  EOMI, no icterus or pallor.  Conjunctivae and lids normal. External ears normal. Wearing a mask.  Neck supple.  No thyromegaly or masses palpated.   Lymph: No cervical or supraclavicular lymphadenopathy.  Cardiovascular: Regular rate and rhythm.    Respiratory: Normal respiratory effort.  Clear to auscultation " bilaterally.  Abdomen: Non-distended, soft, non-tender  Skin: Warm, dry, no rashes.  Musculoskeletal: Gait is normal.  Moves all extremities well.  Extremities: No leg edema.    Psych:  Affect/mood is normal, judgement is good, memory is intact, grooming is appropriate.      Labs (2/19/21) were reviewed and discussed with patients.  Imaging (2/18/21) was reviewed.  All questions were answered.      Assessment and Plan    1. Hemochromatosis, unspecified hemochromatosis type  - REFERRAL TO HEMATOLOGY ONCOLOGY  - Comp Metabolic Panel; Future  - CBC WITH DIFFERENTIAL; Future  - US-RUQ; Future  Lifestyle modifications for fatty liver, impaired fasting glucose  No chest pain, palpitation, SOB  Avoid iron supplement, vit C, uncooked seafood and ETOH  Referral to hematology    2. Vitamin D deficiency  - VITAMIN D,25 HYDROXY; Future  Replacement     3. Fatty liver  - US-RUQ; Future  Healthful lifestyle measures    4. Dyslipidemia  - Lipid Profile; Future  - TSH WITH REFLEX TO FT4; Future  Healthful lifestyle measures  Please check out [email or web address]-> health topics-> cholesterol  Read about the articles and see what you could do to improve your lifestyle.         Follow-up:Return in about 6 months (around 9/4/2021), or if symptoms worsen or fail to improve.    This note was created using voice recognition software. There may be unintended errors in spelling, grammar or content.

## 2021-03-05 NOTE — ASSESSMENT & PLAN NOTE
H63D heterozygous mutation     Ref. Range 6/29/2020 11:09 11/12/2020 08:35 2/19/2021 07:57   Ferritin Latest Ref Range: 22.0 - 322.0 ng/mL 707.0 (H)  616.0 (H)      Ref. Range 6/29/2020 11:09   C282Y Mutation Unknown Negative   H63D Mutation Unknown Heterozygous   S65C Mutation Unknown Negative   Hemochrom Interp Unknown See Note      Ref. Range 6/22/2020 10:15 6/29/2020 11:09 2/19/2021 07:57   AST(SGOT) Latest Ref Range: 12 - 45 U/L 27  16   ALT(SGPT) Latest Ref Range: 2 - 50 U/L 51 (H)  23   Alkaline Phosphatase Latest Ref Range: 30 - 99 U/L 62  64   Total Bilirubin Latest Ref Range: 0.1 - 1.5 mg/dL 0.8  0.8   Albumin Latest Ref Range: 3.2 - 4.9 g/dL 4.7  4.5   Total Protein Latest Ref Range: 6.0 - 8.2 g/dL 7.7  7.4   Globulin Latest Ref Range: 1.9 - 3.5 g/dL 3.0  2.9   A-G Ratio Latest Units: g/dL 1.6  1.6        Ref. Range 6/29/2020 11:09 2/19/2021 07:57   Iron Latest Ref Range: 50 - 180 ug/dL 112 139   Total Iron Binding Latest Ref Range: 250 - 450 ug/dL 280 260   % Saturation Latest Ref Range: 15 - 55 % 40 53   Unsat Iron Binding Latest Ref Range: 110 - 370 ug/dL 168 121      Ref. Range 6/29/2020 11:09   Hep B Surface Antibody Quant Latest Ref Range: 0.00 - 10.00 mIU/mL 14.90 (H)   Hepatitis B Surface Antigen Latest Ref Range: Non-Reactive  Non-Reactive   Hepatitis B Core Ab, Total Latest Ref Range: Non-Reactive  NonReactive   Hepatitis C Antibody Latest Ref Range: Non-Reactive  Non-Reactive     7/2020 US liver  1.  Enlarged liver with subtotal diffuse fatty infiltration appearance. Focal area of sparing is near the gallbladder fossa.  2.  Normal appearance of gallbladder and biliary tree.  3.  Otherwise negative right upper quadrant ultrasound.    2/2021 US liver  1.  Increase in hepatic echogenicity which could be hepatic steatosis, fibrosis, or iron deposition  2.  Mild hepatomegaly  3.  Incidental right renal cyst    Monitor iron storage.  Monitor Liver US +/- AFP Q 6 months  Lifestyle modifications for  fatty liver, impaired fasting glucose  No chest pain, palpitation, SOB  Avoid iron supplement, vit C, uncooked seafood and ETOH  Referral to hematology

## 2021-03-18 ENCOUNTER — OFFICE VISIT (OUTPATIENT)
Dept: HEMATOLOGY ONCOLOGY | Facility: MEDICAL CENTER | Age: 36
End: 2021-03-18
Payer: COMMERCIAL

## 2021-03-18 VITALS
RESPIRATION RATE: 16 BRPM | HEIGHT: 73 IN | SYSTOLIC BLOOD PRESSURE: 108 MMHG | OXYGEN SATURATION: 96 % | WEIGHT: 230.27 LBS | TEMPERATURE: 97.3 F | BODY MASS INDEX: 30.52 KG/M2 | HEART RATE: 74 BPM | DIASTOLIC BLOOD PRESSURE: 82 MMHG

## 2021-03-18 DIAGNOSIS — E66.9 OBESITY (BMI 30-39.9): ICD-10-CM

## 2021-03-18 DIAGNOSIS — E83.19 IRON OVERLOAD: ICD-10-CM

## 2021-03-18 DIAGNOSIS — E83.118 OTHER HEMOCHROMATOSIS: ICD-10-CM

## 2021-03-18 PROCEDURE — 99204 OFFICE O/P NEW MOD 45 MIN: CPT | Performed by: INTERNAL MEDICINE

## 2021-03-18 ASSESSMENT — ENCOUNTER SYMPTOMS
COUGH: 0
BLURRED VISION: 0
BRUISES/BLEEDS EASILY: 0
SPUTUM PRODUCTION: 0
HEADACHES: 0
SENSORY CHANGE: 0
NAUSEA: 0
TINGLING: 0
NERVOUS/ANXIOUS: 0
CONSTIPATION: 0
MYALGIAS: 0
WHEEZING: 0
VOMITING: 0
DIZZINESS: 0
CHILLS: 0
DOUBLE VISION: 0
INSOMNIA: 0
WEIGHT LOSS: 0
DIARRHEA: 0
FEVER: 0
PHOTOPHOBIA: 0
SHORTNESS OF BREATH: 0
DEPRESSION: 0
DIAPHORESIS: 0

## 2021-03-18 ASSESSMENT — PAIN SCALES - GENERAL: PAINLEVEL: NO PAIN

## 2021-03-18 ASSESSMENT — FIBROSIS 4 INDEX: FIB4 SCORE: 0.54

## 2021-03-18 NOTE — PROGRESS NOTES
Consult:  Hematology/Oncology      Referring Physician:   Primary Care:  Hansa Gauthier M.D.    Diagnosis: Iron overload    Chief Complaint:      History of Presenting Illness:  Franco Meléndez is a 35 y.o. male came to medical attention for insomnia which was mostly related to having to attend a small child, noted to have an elevated ferritin on 2 occasion 8 months ago 707 and 3 weeks ago 616.  The iron saturation has been 40 and 53%.  During entire hemochromatosis study were notable for an heterozygosity of the H63D.  The patient after learning to have a formal iron overload modifies diet eating meat less frequently.  Interval History:  Patient is here for consultation.      Past Medical History:   Diagnosis Date   • Colon polyps        Past Surgical History:   Procedure Laterality Date   • HERNIA REPAIR      left side with mesh   • TONSILLECTOMY AND ADENOIDECTOMY         Social History     Socioeconomic History   • Marital status:      Spouse name: Not on file   • Number of children: Not on file   • Years of education: Not on file   • Highest education level: Not on file   Occupational History   • Not on file   Tobacco Use   • Smoking status: Never Smoker   • Smokeless tobacco: Never Used   Substance and Sexual Activity   • Alcohol use: Not Currently     Comment: occ   • Drug use: No   • Sexual activity: Yes     Partners: Female   Other Topics Concern   • Not on file   Social History Narrative   • Not on file     Social Determinants of Health     Financial Resource Strain:    • Difficulty of Paying Living Expenses:    Food Insecurity:    • Worried About Running Out of Food in the Last Year:    • Ran Out of Food in the Last Year:    Transportation Needs:    • Lack of Transportation (Medical):    • Lack of Transportation (Non-Medical):    Physical Activity: Sufficiently Active   • Days of Exercise per Week: 7 days   • Minutes of Exercise per Session: 30 min   Stress:    • Feeling of Stress :     Social Connections:    • Frequency of Communication with Friends and Family:    • Frequency of Social Gatherings with Friends and Family:    • Attends Rastafari Services:    • Active Member of Clubs or Organizations:    • Attends Club or Organization Meetings:    • Marital Status:    Intimate Partner Violence:    • Fear of Current or Ex-Partner:    • Emotionally Abused:    • Physically Abused:    • Sexually Abused:        Family History   Problem Relation Age of Onset   • Cancer Other    • No Known Problems Mother    • No Known Problems Father    • No Known Problems Maternal Grandmother    • No Known Problems Maternal Grandfather    • Cancer Paternal Grandfather         lung cancer, smoking       OB History   No obstetric history on file.       Allergies as of 03/18/2021   • (No Known Allergies)         Current Outpatient Medications:   •  vitamin D (CHOLECALCIFEROL) 1000 Unit (25 mcg) Tab, Take 2,000 Units by mouth every day., Disp: , Rfl:     Review of Systems:  Review of Systems   Constitutional: Negative for chills, diaphoresis, fever, malaise/fatigue and weight loss.   HENT: Negative for nosebleeds and tinnitus.    Eyes: Negative for blurred vision, double vision and photophobia.   Respiratory: Negative for cough, sputum production, shortness of breath and wheezing.    Cardiovascular: Negative for chest pain and leg swelling.   Gastrointestinal: Negative for constipation, diarrhea, nausea and vomiting.   Genitourinary: Negative for dysuria.   Musculoskeletal: Negative for joint pain and myalgias.   Skin: Negative for rash.   Neurological: Negative for dizziness, tingling, sensory change and headaches.   Endo/Heme/Allergies: Does not bruise/bleed easily.   Psychiatric/Behavioral: Negative for depression. The patient is not nervous/anxious and does not have insomnia.           Physical Exam:  Vitals:    03/18/21 0914   BP: 108/82   BP Location: Right arm   Pulse: 74   Resp: 16   Temp: 36.3 °C (97.3 °F)  "  TempSrc: Temporal   SpO2: 96%   Weight: 104 kg (230 lb 4.3 oz)   Height: 1.854 m (6' 1\")       Physical Exam  Vitals reviewed.   Constitutional:       General: He is not in acute distress.  HENT:      Head: Normocephalic.      Mouth/Throat:      Mouth: Mucous membranes are moist.   Eyes:      Extraocular Movements: Extraocular movements intact.      Pupils: Pupils are equal, round, and reactive to light.   Cardiovascular:      Rate and Rhythm: Normal rate and regular rhythm.      Pulses: Normal pulses.      Heart sounds: Normal heart sounds.   Pulmonary:      Effort: Pulmonary effort is normal.      Breath sounds: Normal breath sounds.   Abdominal:      General: Bowel sounds are normal.      Palpations: Abdomen is soft.      Tenderness: There is no abdominal tenderness.   Musculoskeletal:         General: Normal range of motion.      Cervical back: Normal range of motion and neck supple.   Lymphadenopathy:      Head:      Right side of head: No submental, submandibular, tonsillar, preauricular, posterior auricular or occipital adenopathy.      Left side of head: No submental, submandibular, tonsillar, preauricular, posterior auricular or occipital adenopathy.      Cervical: No cervical adenopathy.      Right cervical: No superficial, deep or posterior cervical adenopathy.     Left cervical: No superficial, deep or posterior cervical adenopathy.      Upper Body:      Right upper body: No supraclavicular, axillary, pectoral or epitrochlear adenopathy.      Left upper body: No supraclavicular, axillary, pectoral or epitrochlear adenopathy.      Lower Body: No right inguinal adenopathy. No left inguinal adenopathy.   Skin:     General: Skin is warm and dry.   Neurological:      General: No focal deficit present.      Mental Status: He is alert and oriented to person, place, and time.   Psychiatric:         Mood and Affect: Mood normal.          Labs:  No visits with results within 1 Day(s) from this visit.   Latest " known visit with results is:   Hospital Outpatient Visit on 02/19/2021   Component Date Value Ref Range Status   • Cholesterol,Tot 02/19/2021 202* 100 - 199 mg/dL Final   • Triglycerides 02/19/2021 201* 0 - 149 mg/dL Final   • HDL 02/19/2021 51  >=40 mg/dL Final   • LDL 02/19/2021 111* <100 mg/dL Final   • Iron 02/19/2021 139  50 - 180 ug/dL Final   • Total Iron Binding 02/19/2021 260  250 - 450 ug/dL Final   • Unsat Iron Binding 02/19/2021 121  110 - 370 ug/dL Final   • % Saturation 02/19/2021 53  15 - 55 % Final   • WBC 02/19/2021 6.9  4.8 - 10.8 K/uL Final   • RBC 02/19/2021 5.46  4.70 - 6.10 M/uL Final   • Hemoglobin 02/19/2021 17.1  14.0 - 18.0 g/dL Final   • Hematocrit 02/19/2021 47.4  42.0 - 52.0 % Final   • MCV 02/19/2021 86.8  81.4 - 97.8 fL Final   • MCH 02/19/2021 31.3  27.0 - 33.0 pg Final   • MCHC 02/19/2021 36.1* 33.7 - 35.3 g/dL Final   • RDW 02/19/2021 38.5  35.9 - 50.0 fL Final   • Platelet Count 02/19/2021 211  164 - 446 K/uL Final   • MPV 02/19/2021 11.3  9.0 - 12.9 fL Final   • Neutrophils-Polys 02/19/2021 45.00  44.00 - 72.00 % Final   • Lymphocytes 02/19/2021 41.60* 22.00 - 41.00 % Final   • Monocytes 02/19/2021 11.00  0.00 - 13.40 % Final   • Eosinophils 02/19/2021 1.40  0.00 - 6.90 % Final   • Basophils 02/19/2021 0.90  0.00 - 1.80 % Final   • Immature Granulocytes 02/19/2021 0.10  0.00 - 0.90 % Final   • Nucleated RBC 02/19/2021 0.00  /100 WBC Final   • Neutrophils (Absolute) 02/19/2021 3.12  1.82 - 7.42 K/uL Final    Includes immature neutrophils, if present.   • Lymphs (Absolute) 02/19/2021 2.88  1.00 - 4.80 K/uL Final   • Monos (Absolute) 02/19/2021 0.76  0.00 - 0.85 K/uL Final   • Eos (Absolute) 02/19/2021 0.10  0.00 - 0.51 K/uL Final   • Baso (Absolute) 02/19/2021 0.06  0.00 - 0.12 K/uL Final   • Immature Granulocytes (abs) 02/19/2021 0.01  0.00 - 0.11 K/uL Final   • NRBC (Absolute) 02/19/2021 0.00  K/uL Final   • Ferritin 02/19/2021 616.0* 22.0 - 322.0 ng/mL Final   • Sodium 02/19/2021  138  135 - 145 mmol/L Final   • Potassium 02/19/2021 4.1  3.6 - 5.5 mmol/L Final   • Chloride 02/19/2021 103  96 - 112 mmol/L Final   • Co2 02/19/2021 22  20 - 33 mmol/L Final   • Anion Gap 02/19/2021 13.0  7.0 - 16.0 Final   • Glucose 02/19/2021 91  65 - 99 mg/dL Final   • Bun 02/19/2021 15  8 - 22 mg/dL Final   • Creatinine 02/19/2021 1.12  0.50 - 1.40 mg/dL Final   • Calcium 02/19/2021 9.1  8.4 - 10.2 mg/dL Final   • AST(SGOT) 02/19/2021 16  12 - 45 U/L Final   • ALT(SGPT) 02/19/2021 23  2 - 50 U/L Final   • Alkaline Phosphatase 02/19/2021 64  30 - 99 U/L Final   • Total Bilirubin 02/19/2021 0.8  0.1 - 1.5 mg/dL Final   • Albumin 02/19/2021 4.5  3.2 - 4.9 g/dL Final   • Total Protein 02/19/2021 7.4  6.0 - 8.2 g/dL Final   • Globulin 02/19/2021 2.9  1.9 - 3.5 g/dL Final   • A-G Ratio 02/19/2021 1.6  g/dL Final   • Fasting Status 02/19/2021 Fasting   Final   • GFR If  02/19/2021 >60  >60 mL/min/1.73 m 2 Final   • GFR If Non  02/19/2021 >60  >60 mL/min/1.73 m 2 Final       Imaging:   [id number]-RUQ    Result Date: 2/18/2021 2/18/2021 8:26 AM HISTORY/REASON FOR EXAM:  hemachromatosis Abdominal pain Abnormal liver enzyme TECHNIQUE/EXAM DESCRIPTION AND NUMBER OF VIEWS:  Real-time sonography of the liver and biliary tree. COMPARISON: Abdominal ultrasound 7/9/2020 FINDINGS: The liver is echogenic. This could indicate hepatic steatosis, fibrosis, or iron deposition. The liver measures 18.34 cm. The gallbladder is normal. There is no evidence of cholelithiasis. The gallbladder wall thickness measures 0.20 cm. There is no pericholecystic fluid. The common duct measures 0.35 cm. The visualized pancreas is unremarkable. The visualized aorta is normal in caliber. Intrahepatic IVC is patent. The portal vein is patent with hepatopetal flow. The MPV measures 1.03 cm. The right kidney measures 11.17 cm. In the lower pole right kidney there is a simple 2.3 cm cyst. There is no ascites.     1.  Increase in  hepatic echogenicity which could be hepatic steatosis, fibrosis, or iron deposition 2.  Mild hepatomegaly 3.  Incidental right renal cyst         Assessment & Plan:  35-year-old male with elevation of ferritin in the range of 600.  Iron saturation over 50%.  It heterozygous for H63D.  Mild elevation of LFTs resolve compared to June 2020.  Additional work-up is justified based on the presence of a ferritin that is way over 300 and iron saturation of the now is over 50% and has increased compared to 8 months ago.    Comment:    Although additional work-up is indicated, the probability that our patient will develop hemochromatosis is remote if the only risk factor is the presence of an heterozygous H63D.  In fact, in the [place] collaborative cohort study, where [id number],192 patient between the age of 40 and 69 were followed over the course of 12 years, only 1 patient with an heterozygous H63D was included in the provisional iron overload and related diseases category.  None was included in the iron overload and related disease category.  To put this into perspective we have to consider that only 28.4% of the C282Y homozygous developed iron overload related disease.    To this end it is worthwhile to investigate if our patient may have additional risk factor for iron overload which may enhance the effect  Of the heterozygous H63D on iron overload.  To further corroborate this concept, the HEIRS study ([phone] Pts) suggests an incidence of iron overload 40 H63D if heterozygous which is similar to the general population.  Parenthetically to  patient were noted to have iron overload 1 without the mutation and 1 with an H63D heterozygous.  Nonetheless we should mention acute to chronic liver disease, alcoholic liver disease, ineffective erythropoiesis like B12 deficiency, congenital dyserythropoietic anemia in addition mutation in the iron regulatory protein like Ferroportin, Hemojuvelin, Hepcidin, Hemojuvelin, hepcidin,  ceruloplasmin or transferritin receptor-2.    The plan is for now to work-up the most common additional causes for iron overload    Obtain a liver MRI for quantification of iron overload  Consider referral for liver MRI for quantification of her overload to a center with expertise in the metodology    Monitor ferritin on a regular basis  TSH  Testosterone level  LFT's  HbA1c  S/s of arthritis in the 2nd 3th [facility]        Dietary counseling including avoiding alcohol beverage.    Therapeutic phlebotomy is appropriate for individuals with an HH genotype (biallelic HFE mutations or other mutations strongly associated with HH) and iron overload, as manifested by one or more of the following:  ?Serum ferritin ?1000 ng/mL (?2247 pmol/L) and often ?500 ng/mL (?1124 pmol/L).  ?Evidence of tissue injury (eg, increased hepatic transaminase levels, reduced hormone levels, reduced cardiac ejection fraction).  ?Increased tissue iron by magnetic resonance imaging (MRI), other imaging study, or tissue biopsy    RT 3 months      he agreed and verbalized his agreement and understanding with the current plan. I answered all questions and concerns he has at this time and advised him to call at any time in the interim with questions or concerns in regards to his care.     Thank you for allowing me to participate in his care, I will continue to follow.

## 2021-04-01 ENCOUNTER — APPOINTMENT (OUTPATIENT)
Dept: RADIOLOGY | Facility: MEDICAL CENTER | Age: 36
End: 2021-04-01
Attending: INTERNAL MEDICINE
Payer: COMMERCIAL

## 2021-04-01 DIAGNOSIS — E83.118 OTHER HEMOCHROMATOSIS: ICD-10-CM

## 2021-04-01 PROCEDURE — 74183 MRI ABD W/O CNTR FLWD CNTR: CPT

## 2021-04-01 PROCEDURE — 700117 HCHG RX CONTRAST REV CODE 255: Performed by: INTERNAL MEDICINE

## 2021-04-01 PROCEDURE — A9576 INJ PROHANCE MULTIPACK: HCPCS | Performed by: INTERNAL MEDICINE

## 2021-04-01 RX ADMIN — GADOTERIDOL 20 ML: 279.3 INJECTION, SOLUTION INTRAVENOUS at 17:06

## 2021-06-11 ENCOUNTER — HOSPITAL ENCOUNTER (OUTPATIENT)
Dept: LAB | Facility: MEDICAL CENTER | Age: 36
End: 2021-06-11
Attending: INTERNAL MEDICINE
Payer: COMMERCIAL

## 2021-06-11 DIAGNOSIS — E83.19 IRON OVERLOAD: ICD-10-CM

## 2021-06-11 LAB
ALBUMIN SERPL BCP-MCNC: 4.8 G/DL (ref 3.2–4.9)
ALBUMIN/GLOB SERPL: 1.6 G/DL
ALP SERPL-CCNC: 69 U/L (ref 30–99)
ALT SERPL-CCNC: 21 U/L (ref 2–50)
ANION GAP SERPL CALC-SCNC: 9 MMOL/L (ref 7–16)
AST SERPL-CCNC: 15 U/L (ref 12–45)
BILIRUB SERPL-MCNC: 0.7 MG/DL (ref 0.1–1.5)
BUN SERPL-MCNC: 12 MG/DL (ref 8–22)
CALCIUM SERPL-MCNC: 9.8 MG/DL (ref 8.4–10.2)
CHLORIDE SERPL-SCNC: 102 MMOL/L (ref 96–112)
CO2 SERPL-SCNC: 27 MMOL/L (ref 20–33)
CREAT SERPL-MCNC: 1.21 MG/DL (ref 0.5–1.4)
CRP SERPL HS-MCNC: <0.3 MG/DL (ref 0–0.75)
FASTING STATUS PATIENT QL REPORTED: NORMAL
FERRITIN SERPL-MCNC: 501 NG/ML (ref 22–322)
GLOBULIN SER CALC-MCNC: 3 G/DL (ref 1.9–3.5)
GLUCOSE SERPL-MCNC: 85 MG/DL (ref 65–99)
POTASSIUM SERPL-SCNC: 4.3 MMOL/L (ref 3.6–5.5)
PROT SERPL-MCNC: 7.8 G/DL (ref 6–8.2)
SODIUM SERPL-SCNC: 138 MMOL/L (ref 135–145)
TESTOST SERPL-MCNC: 395 NG/DL (ref 175–781)
TSH SERPL DL<=0.005 MIU/L-ACNC: 2.26 UIU/ML (ref 0.38–5.33)

## 2021-06-11 PROCEDURE — 86140 C-REACTIVE PROTEIN: CPT

## 2021-06-11 PROCEDURE — 80053 COMPREHEN METABOLIC PANEL: CPT

## 2021-06-11 PROCEDURE — 36415 COLL VENOUS BLD VENIPUNCTURE: CPT

## 2021-06-11 PROCEDURE — 84403 ASSAY OF TOTAL TESTOSTERONE: CPT

## 2021-06-11 PROCEDURE — 84443 ASSAY THYROID STIM HORMONE: CPT

## 2021-06-11 PROCEDURE — 82728 ASSAY OF FERRITIN: CPT

## 2021-06-17 ENCOUNTER — APPOINTMENT (OUTPATIENT)
Dept: HEMATOLOGY ONCOLOGY | Facility: MEDICAL CENTER | Age: 36
End: 2021-06-17
Payer: COMMERCIAL

## 2021-06-18 ENCOUNTER — OFFICE VISIT (OUTPATIENT)
Dept: HEMATOLOGY ONCOLOGY | Facility: MEDICAL CENTER | Age: 36
End: 2021-06-18
Payer: COMMERCIAL

## 2021-06-18 VITALS
HEIGHT: 73 IN | DIASTOLIC BLOOD PRESSURE: 68 MMHG | HEART RATE: 74 BPM | OXYGEN SATURATION: 96 % | RESPIRATION RATE: 12 BRPM | WEIGHT: 236 LBS | TEMPERATURE: 97.9 F | SYSTOLIC BLOOD PRESSURE: 104 MMHG | BODY MASS INDEX: 31.28 KG/M2

## 2021-06-18 DIAGNOSIS — E83.19 IRON OVERLOAD: ICD-10-CM

## 2021-06-18 DIAGNOSIS — Z09 ENCOUNTER FOR HEMATOLOGY FOLLOW-UP: ICD-10-CM

## 2021-06-18 DIAGNOSIS — E83.119 HEMOCHROMATOSIS, UNSPECIFIED HEMOCHROMATOSIS TYPE: ICD-10-CM

## 2021-06-18 PROCEDURE — 99213 OFFICE O/P EST LOW 20 MIN: CPT | Performed by: NURSE PRACTITIONER

## 2021-06-18 ASSESSMENT — ENCOUNTER SYMPTOMS
FEVER: 0
DIAPHORESIS: 0
CHILLS: 0
MYALGIAS: 0
TINGLING: 0
WHEEZING: 0
CONSTIPATION: 0
SHORTNESS OF BREATH: 0
BLOOD IN STOOL: 0
DIZZINESS: 0
NAUSEA: 0
COUGH: 0
VOMITING: 0
WEIGHT LOSS: 0
PALPITATIONS: 0
DIARRHEA: 0
HEADACHES: 0
INSOMNIA: 0

## 2021-06-18 ASSESSMENT — FIBROSIS 4 INDEX: FIB4 SCORE: 0.53

## 2021-06-18 NOTE — PROGRESS NOTES
Subjective:      Franco Meléndez is a 35 y.o. male who presents with Hemochromatosis (iron overload; MRI Results )        HPI   Mr. Meléndez presents for evaluation ferritin and review of MRI results. He is unaccompanied for today's visit.    Patient was referred ion 3/2021 per PCP for further evaluation of hemochromatosis panel that was completed for elevated MCHC.  Hematology work-up was further facilitated by Dr. Reveles and included: Heterozygous for H63D mutation; negative hepatitis & HIV panel; normal TSH, C-reactive protein, and testosterone. MRI of liver completed 4/1/21 showed fatty liver, normal size, no concerns for hemochromatosis changes. Elevated ferritin has been slowly decreasing with routine blood donation.    Patient is feeling well overall.  He has implemented diet changes, less red meat, and is donating blood about every 9 weeks. Trace BLE edema occurs if he is sitting for too long or working.  Patient is otherwise at his baseline and asymptomatic in regards to hemochromatosis, iron overload.        Review of Systems   Constitutional: Negative for chills, diaphoresis, fever, malaise/fatigue and weight loss.   Respiratory: Negative for cough, shortness of breath and wheezing.    Cardiovascular: Positive for leg swelling (trace if sitting for long periods). Negative for chest pain and palpitations.   Gastrointestinal: Negative for blood in stool, constipation, diarrhea, melena, nausea and vomiting.   Genitourinary: Negative for dysuria.   Musculoskeletal: Negative for joint pain and myalgias.   Neurological: Negative for dizziness, tingling and headaches.   Psychiatric/Behavioral: The patient does not have insomnia.          No Known Allergies        Current Outpatient Medications on File Prior to Visit   Medication Sig Dispense Refill   • vitamin D (CHOLECALCIFEROL) 1000 Unit (25 mcg) Tab Take 2,000 Units by mouth every day. (Patient not taking: Reported on 6/18/2021)       No current  "facility-administered medications on file prior to visit.            Objective:     /68 (BP Location: Right arm, Patient Position: Sitting, BP Cuff Size: Adult)   Pulse 74   Temp 36.6 °C (97.9 °F) (Temporal)   Resp 12   Ht 1.854 m (6' 0.99\")   Wt 107 kg (236 lb)   SpO2 96%   BMI 31.14 kg/m²      Physical Exam  Vitals reviewed.   Constitutional:       General: He is not in acute distress.     Appearance: He is well-developed. He is not diaphoretic.   HENT:      Head: Normocephalic and atraumatic.   Eyes:      General: No scleral icterus.        Right eye: No discharge.         Left eye: No discharge.   Cardiovascular:      Rate and Rhythm: Normal rate and regular rhythm.      Heart sounds: Normal heart sounds. No murmur heard.   No friction rub. No gallop.    Pulmonary:      Effort: Pulmonary effort is normal. No respiratory distress.      Breath sounds: Normal breath sounds. No wheezing.   Abdominal:      General: There is no distension.      Palpations: Abdomen is soft. There is no hepatomegaly or splenomegaly.      Tenderness: There is no abdominal tenderness.   Musculoskeletal:         General: Normal range of motion.      Cervical back: Normal range of motion.   Skin:     General: Skin is warm and dry.      Coloration: Skin is not pale.      Findings: No erythema or rash.   Neurological:      Mental Status: He is alert and oriented to person, place, and time.   Psychiatric:         Behavior: Behavior normal.                                           MRI Liver  4/1/2021 4:06 PM  HISTORY/REASON FOR EXAM:  Hemochromatosis, H63D heterozygous mutation. Elevated ferritin.    TECHNIQUE/EXAM DESCRIPTION: MRI of the liver with dynamic IV gadolinium enhancement.     MR imaging of the liver was performed.  MR images of the liver were obtained with coronal and axial single-shot fast spin-echo T2, fat-suppressed axial FRFSE T2, axial in-phase and out-of-phase FSPGR T1, axial DWI with a b-value of 600, 3D MRCP,  "   precontrast fat-suppressed FSPGR T1, dynamic gadolinium enhanced axial fat-suppressed T1 FSPGR in the arterial dominant, portal venous, 2-minute and 4-minute delayed phases, with delayed coronal fat-suppressed T1 FSPGR sequence. .     The study was performed on a [employer] 3.0 [employer]sey MRI scanner.     20 mL ProHance contrast was administered intravenously.     COMPARISON: Ultrasound 2/18/2021     FINDINGS:  The patient became nauseated after the gadolinium injection. There was slight delay in the post contrast imaging sequences.     Liver: The liver measures approximately 19.5 cm in length in the midclavicular line. There is normal homogeneous T2 signal and interface T1 signal. There is patchy decreased signal on the out of phase T1-weighted imaging consistent with fatty   infiltration. There is no evidence of low T1 signal on the in phase imaging to suggest significant iron deposition. There is no restricted diffusion. There is homogeneous enhancement with no solid mass identified. There are a few tiny simple hepatic   cysts.     Vasculature: The hepatic, portal, superior mesenteric, and splenic veins are patent.     Gallbladder and biliary system: Normal.     Pancreas: Normal.     Spleen: Normal in size measuring 11.7 cm in diameter with homogeneous enhancement.     Kidneys: Symmetric enhancement. No solid masses or hydronephrosis. There is an incidental simple 19 mm right renal cyst. There are tiny left renal cortical cysts.     Adrenal glands: Normal.     Ascites: None.     Lymph nodes: No adenopathy.     Bowel: Unremarkable.     The musculoskeletal structures are within normal limits.     IMPRESSION:  1.  There is mild hepatomegaly with geographic hepatic steatosis.  2.  There is no abnormal signal to suggest significant hemachromatosis.  3.  Spleen is normal in size with homogeneous enhancement.          Assessment/Plan:        1. Hemochromatosis, unspecified hemochromatosis type     2. Iron overload  CBC WITH  DIFFERENTIAL    FERRITIN   3. Encounter for hematology follow-up           1.  Hemachromatosis/elevated ferritin: Patient diagnosed with heterozygous mutation in 3/2021 correlating with elevated ferritin.  Ferritin has been improving with changes in diet as well as blood donation every 9 weeks. Patient will continue with current plan, repeat labs in 6 months and return for reevaluation, sooner as needed.            The patient verbalized agreement and understanding of current plan. All questions and concerns were addressed at time of visit.    Please note that this dictation was created using voice recognition software. I have made every reasonable attempt to correct obvious errors, but I expect that there are errors of grammar and possibly content that I did not discover before finalizing the note.

## 2022-01-07 ENCOUNTER — APPOINTMENT (OUTPATIENT)
Dept: HEMATOLOGY ONCOLOGY | Facility: MEDICAL CENTER | Age: 37
End: 2022-01-07
Payer: COMMERCIAL

## 2022-01-17 ENCOUNTER — TELEPHONE (OUTPATIENT)
Dept: HEMATOLOGY ONCOLOGY | Facility: MEDICAL CENTER | Age: 37
End: 2022-01-17

## 2022-01-17 NOTE — TELEPHONE ENCOUNTER
Patient cancelled appointment and refused to reschedule. He states his labs came back normal and his PCP is managing his care

## 2022-02-23 ENCOUNTER — HOSPITAL ENCOUNTER (EMERGENCY)
Facility: MEDICAL CENTER | Age: 37
End: 2022-02-23
Attending: EMERGENCY MEDICINE
Payer: COMMERCIAL

## 2022-02-23 VITALS
RESPIRATION RATE: 16 BRPM | HEIGHT: 73 IN | BODY MASS INDEX: 30.45 KG/M2 | HEART RATE: 82 BPM | OXYGEN SATURATION: 98 % | SYSTOLIC BLOOD PRESSURE: 119 MMHG | TEMPERATURE: 99.5 F | DIASTOLIC BLOOD PRESSURE: 65 MMHG | WEIGHT: 229.72 LBS

## 2022-02-23 DIAGNOSIS — R11.2 NAUSEA AND VOMITING, INTRACTABILITY OF VOMITING NOT SPECIFIED, UNSPECIFIED VOMITING TYPE: ICD-10-CM

## 2022-02-23 DIAGNOSIS — R19.7 DIARRHEA, UNSPECIFIED TYPE: ICD-10-CM

## 2022-02-23 DIAGNOSIS — N17.9 AKI (ACUTE KIDNEY INJURY) (HCC): ICD-10-CM

## 2022-02-23 LAB
ALBUMIN SERPL BCP-MCNC: 5.1 G/DL (ref 3.2–4.9)
ALBUMIN/GLOB SERPL: 1.5 G/DL
ALP SERPL-CCNC: 76 U/L (ref 30–99)
ALT SERPL-CCNC: 29 U/L (ref 2–50)
ANION GAP SERPL CALC-SCNC: 21 MMOL/L (ref 7–16)
AST SERPL-CCNC: 19 U/L (ref 12–45)
BASOPHILS # BLD AUTO: 0.7 % (ref 0–1.8)
BASOPHILS # BLD: 0.14 K/UL (ref 0–0.12)
BILIRUB SERPL-MCNC: 0.9 MG/DL (ref 0.1–1.5)
BUN SERPL-MCNC: 17 MG/DL (ref 8–22)
CALCIUM SERPL-MCNC: 10 MG/DL (ref 8.4–10.2)
CHLORIDE SERPL-SCNC: 101 MMOL/L (ref 96–112)
CO2 SERPL-SCNC: 18 MMOL/L (ref 20–33)
CREAT SERPL-MCNC: 1.56 MG/DL (ref 0.5–1.4)
EOSINOPHIL # BLD AUTO: 0.05 K/UL (ref 0–0.51)
EOSINOPHIL NFR BLD: 0.3 % (ref 0–6.9)
ERYTHROCYTE [DISTWIDTH] IN BLOOD BY AUTOMATED COUNT: 41.2 FL (ref 35.9–50)
GLOBULIN SER CALC-MCNC: 3.5 G/DL (ref 1.9–3.5)
GLUCOSE SERPL-MCNC: 134 MG/DL (ref 65–99)
HCT VFR BLD AUTO: 53.6 % (ref 42–52)
HGB BLD-MCNC: 18.9 G/DL (ref 14–18)
IMM GRANULOCYTES # BLD AUTO: 0.14 K/UL (ref 0–0.11)
IMM GRANULOCYTES NFR BLD AUTO: 0.7 % (ref 0–0.9)
LIPASE SERPL-CCNC: 21 U/L (ref 7–58)
LYMPHOCYTES # BLD AUTO: 0.74 K/UL (ref 1–4.8)
LYMPHOCYTES NFR BLD: 3.8 % (ref 22–41)
MCH RBC QN AUTO: 30.9 PG (ref 27–33)
MCHC RBC AUTO-ENTMCNC: 35.3 G/DL (ref 33.7–35.3)
MCV RBC AUTO: 87.7 FL (ref 81.4–97.8)
MONOCYTES # BLD AUTO: 1.62 K/UL (ref 0–0.85)
MONOCYTES NFR BLD AUTO: 8.3 % (ref 0–13.4)
NEUTROPHILS # BLD AUTO: 16.78 K/UL (ref 1.82–7.42)
NEUTROPHILS NFR BLD: 86.2 % (ref 44–72)
NRBC # BLD AUTO: 0 K/UL
NRBC BLD-RTO: 0 /100 WBC
PLATELET # BLD AUTO: 274 K/UL (ref 164–446)
PMV BLD AUTO: 11.1 FL (ref 9–12.9)
POTASSIUM SERPL-SCNC: 4 MMOL/L (ref 3.6–5.5)
PROT SERPL-MCNC: 8.6 G/DL (ref 6–8.2)
RBC # BLD AUTO: 6.11 M/UL (ref 4.7–6.1)
SODIUM SERPL-SCNC: 140 MMOL/L (ref 135–145)
WBC # BLD AUTO: 19.5 K/UL (ref 4.8–10.8)

## 2022-02-23 PROCEDURE — 83690 ASSAY OF LIPASE: CPT

## 2022-02-23 PROCEDURE — 96375 TX/PRO/DX INJ NEW DRUG ADDON: CPT

## 2022-02-23 PROCEDURE — 85025 COMPLETE CBC W/AUTO DIFF WBC: CPT

## 2022-02-23 PROCEDURE — 700111 HCHG RX REV CODE 636 W/ 250 OVERRIDE (IP): Performed by: EMERGENCY MEDICINE

## 2022-02-23 PROCEDURE — 80053 COMPREHEN METABOLIC PANEL: CPT

## 2022-02-23 PROCEDURE — 96374 THER/PROPH/DIAG INJ IV PUSH: CPT

## 2022-02-23 PROCEDURE — 99285 EMERGENCY DEPT VISIT HI MDM: CPT

## 2022-02-23 PROCEDURE — 700105 HCHG RX REV CODE 258: Performed by: EMERGENCY MEDICINE

## 2022-02-23 RX ORDER — DIPHENHYDRAMINE HYDROCHLORIDE 50 MG/ML
12.5 INJECTION INTRAMUSCULAR; INTRAVENOUS ONCE
Status: COMPLETED | OUTPATIENT
Start: 2022-02-23 | End: 2022-02-23

## 2022-02-23 RX ORDER — SODIUM CHLORIDE 9 MG/ML
1000 INJECTION, SOLUTION INTRAVENOUS ONCE
Status: COMPLETED | OUTPATIENT
Start: 2022-02-23 | End: 2022-02-23

## 2022-02-23 RX ORDER — ONDANSETRON 2 MG/ML
4 INJECTION INTRAMUSCULAR; INTRAVENOUS ONCE
Status: COMPLETED | OUTPATIENT
Start: 2022-02-23 | End: 2022-02-23

## 2022-02-23 RX ORDER — PROCHLORPERAZINE EDISYLATE 5 MG/ML
10 INJECTION INTRAMUSCULAR; INTRAVENOUS ONCE
Status: COMPLETED | OUTPATIENT
Start: 2022-02-23 | End: 2022-02-23

## 2022-02-23 RX ORDER — KETOROLAC TROMETHAMINE 30 MG/ML
15 INJECTION, SOLUTION INTRAMUSCULAR; INTRAVENOUS ONCE
Status: COMPLETED | OUTPATIENT
Start: 2022-02-23 | End: 2022-02-23

## 2022-02-23 RX ORDER — ONDANSETRON 4 MG/1
4 TABLET, FILM COATED ORAL EVERY 8 HOURS PRN
Qty: 6 TABLET | Refills: 1 | Status: SHIPPED | OUTPATIENT
Start: 2022-02-23 | End: 2022-11-04

## 2022-02-23 RX ADMIN — SODIUM CHLORIDE 1000 ML: 9 INJECTION, SOLUTION INTRAVENOUS at 06:49

## 2022-02-23 RX ADMIN — KETOROLAC TROMETHAMINE 15 MG: 30 INJECTION, SOLUTION INTRAMUSCULAR at 06:49

## 2022-02-23 RX ADMIN — PROCHLORPERAZINE EDISYLATE 10 MG: 5 INJECTION INTRAMUSCULAR; INTRAVENOUS at 07:48

## 2022-02-23 RX ADMIN — DIPHENHYDRAMINE HYDROCHLORIDE 12.5 MG: 50 INJECTION INTRAMUSCULAR; INTRAVENOUS at 07:49

## 2022-02-23 RX ADMIN — ONDANSETRON 4 MG: 2 INJECTION INTRAMUSCULAR; INTRAVENOUS at 06:49

## 2022-02-23 ASSESSMENT — FIBROSIS 4 INDEX: FIB4 SCORE: 0.54

## 2022-02-23 NOTE — ED NOTES
Pt to wait lying down until [employer] arrives. Discharge signed and all follow-up provided, he will  Rx at pharmacy, rest and drink fluids . To see PCP as needed if not better.

## 2022-02-23 NOTE — ED PROVIDER NOTES
"ED Provider Note    CHIEF COMPLAINT  Chief Complaint   Patient presents with   • Diarrhea     Pt reports all symptoms started tonight at midnight.    • N/V   • Abdominal Pain       HPI  Franco Meléndez is a 36 y.o. male who presents to the Emergency Department with a chief complaint of vomiting and diarrhea that started at midnight.  The patient states that he has a 2-year-old daughter who has been sick with vomiting and diarrhea over the weekend.  At about midnight last night he started having similar symptoms.  He states he had so much vomiting he could not stop, he has crampy not constant abdominal pain, no fever and liquidy diarrhea.  He denies any pain with cough fever.  He has had a hernia repair in the past.  He also has colonic polyps    REVIEW OF SYSTEMS  As above, all other systems negative.  PAST MEDICAL HISTORY   has a past medical history of Colon polyps.    SOCIAL HISTORY  Social History     Tobacco Use   • Smoking status: Never Smoker   • Smokeless tobacco: Never Used   Vaping Use   • Vaping Use: Never used   Substance and Sexual Activity   • Alcohol use: Not Currently     Comment: occ   • Drug use: No   • Sexual activity: Yes     Partners: Female       SURGICAL HISTORY   has a past surgical history that includes hernia repair and tonsillectomy and adenoidectomy.    CURRENT MEDICATIONS  Reviewed.  See Encounter Summary.     ALLERGIES  Allergies   Allergen Reactions   • Azithromycin      Pt reports that he gets an upset stomach        PHYSICAL EXAM  VITAL SIGNS: /68   Pulse (!) 126   Temp (!) 35.7 °C (96.3 °F) (Tympanic)   Resp 16   Ht 1.854 m (6' 1\")   Wt 104 kg (229 lb 11.5 oz)   SpO2 98%   BMI 30.31 kg/m²   Constitutional: Actively retching, Alert in no apparent distress.  HENT: Normocephalic, Bilateral external ears normal. Nose normal.   Eyes: Pupils are equal and reactive. Conjunctiva normal, non-icteric.   Thorax & Lungs: Easy unlabored respirations  Abdomen:  No gross signs of " peritonitis, no pain with movement   Skin: Visualized skin is  Dry, No erythema, No rash.   Extremities:   No edema, No asymmetry  Neurologic: Alert, Grossly non-focal.   Psychiatric: Affect and Mood normal      COURSE & MEDICAL DECISION MAKING  Nursing notes and vital signs were reviewed. (See chart for details)    The patient presents to the Emergency Department with *vomiting and diarrhea that started at midnight, likely viral gastroenteritis as his daughter has similar symptoms and is rampant in the community right now.  He has no peritoneal signs on exam no pain with cough or movement.  Patient in the emergency department arrived tachycardic with this and persistent vomiting he had an IV established and he was givien normal saline bolus of 1 L, and IV Zofran.  IV Toradol was given for pain relief.    On repeat evaluation the patient had no abdominal pain, was able to keep water down with no persistent vomiting but did have some persistent nausea.  At this point he was given a dose of IV Compazine and low-dose Benadryl.    Labs were revealing for an elevated white blood cell count mild acidosis and MARIMAR.  At this point he has no nausea is holding down oral fluids and has no abdominal pain.  I suspect his leukocytosis is reactive, he has been hydrated his tachycardia normalized to 82.  He is comfortable going home with Zofran he will return if he has persistent abdominal pain or unable to keep fluids down with the Zofran.      The patient was discharged home with an information sheet on vomiting and diarrhea and told to return immediately for any signs or symptoms listed, or any unexpected symptoms.  The patient verbally agreed to the discharge precautions and follow-up plan which is documented in EPIC.  DISPOSITION:    Patient will be discharged home in stable condition.    FOLLOW UP:  Hansa Gauthier M.D.  [address] [address] [facility] [address]  [facility] [id number] [phone]      return to ED if belly pain that  says or unable to keep fluid down with medication      OUTPATIENT MEDICATIONS:  New Prescriptions    ONDANSETRON (ZOFRAN) 4 MG TAB TABLET    Take 1 Tablet by mouth every 8 hours as needed for Nausea/Vomiting.         FINAL IMPRESSION   1. Nausea and vomiting, intractability of vomiting not specified, unspecified vomiting type    2. Diarrhea, unspecified type    3. MARIMAR (acute kidney injury) (HCC)

## 2022-11-04 ENCOUNTER — OFFICE VISIT (OUTPATIENT)
Dept: MEDICAL GROUP | Facility: MEDICAL CENTER | Age: 37
End: 2022-11-04
Payer: COMMERCIAL

## 2022-11-04 VITALS
OXYGEN SATURATION: 97 % | TEMPERATURE: 98 F | HEART RATE: 74 BPM | WEIGHT: 238.1 LBS | DIASTOLIC BLOOD PRESSURE: 68 MMHG | HEIGHT: 73 IN | SYSTOLIC BLOOD PRESSURE: 96 MMHG | RESPIRATION RATE: 16 BRPM | BODY MASS INDEX: 31.56 KG/M2

## 2022-11-04 DIAGNOSIS — E78.5 DYSLIPIDEMIA: ICD-10-CM

## 2022-11-04 DIAGNOSIS — D17.22 LIPOMA OF LEFT UPPER EXTREMITY: ICD-10-CM

## 2022-11-04 DIAGNOSIS — R79.89 ELEVATED SERUM CREATININE: ICD-10-CM

## 2022-11-04 DIAGNOSIS — R73.01 IMPAIRED FASTING GLUCOSE: ICD-10-CM

## 2022-11-04 DIAGNOSIS — L98.9 FACE LESION: ICD-10-CM

## 2022-11-04 DIAGNOSIS — E55.9 VITAMIN D DEFICIENCY: ICD-10-CM

## 2022-11-04 DIAGNOSIS — E83.119 HEMOCHROMATOSIS, UNSPECIFIED HEMOCHROMATOSIS TYPE: ICD-10-CM

## 2022-11-04 PROBLEM — R74.01 TRANSAMINITIS: Status: RESOLVED | Noted: 2020-06-29 | Resolved: 2022-11-04

## 2022-11-04 PROCEDURE — 99214 OFFICE O/P EST MOD 30 MIN: CPT | Performed by: STUDENT IN AN ORGANIZED HEALTH CARE EDUCATION/TRAINING PROGRAM

## 2022-11-04 ASSESSMENT — PATIENT HEALTH QUESTIONNAIRE - PHQ9
4. FEELING TIRED OR HAVING LITTLE ENERGY: SEVERAL DAYS
SUM OF ALL RESPONSES TO PHQ QUESTIONS 1-9: 2
2. FEELING DOWN, DEPRESSED, IRRITABLE, OR HOPELESS: NOT AT ALL
3. TROUBLE FALLING OR STAYING ASLEEP OR SLEEPING TOO MUCH: NOT AT ALL
5. POOR APPETITE OR OVEREATING: NOT AT ALL
8. MOVING OR SPEAKING SO SLOWLY THAT OTHER PEOPLE COULD HAVE NOTICED. OR THE OPPOSITE, BEING SO FIGETY OR RESTLESS THAT YOU HAVE BEEN MOVING AROUND A LOT MORE THAN USUAL: NOT AT ALL
1. LITTLE INTEREST OR PLEASURE IN DOING THINGS: SEVERAL DAYS
6. FEELING BAD ABOUT YOURSELF - OR THAT YOU ARE A FAILURE OR HAVE LET YOURSELF OR YOUR FAMILY DOWN: NOT AL ALL
9. THOUGHTS THAT YOU WOULD BE BETTER OFF DEAD, OR OF HURTING YOURSELF: NOT AT ALL
SUM OF ALL RESPONSES TO PHQ9 QUESTIONS 1 AND 2: 1
7. TROUBLE CONCENTRATING ON THINGS, SUCH AS READING THE NEWSPAPER OR WATCHING TELEVISION: NOT AT ALL

## 2022-11-04 ASSESSMENT — FIBROSIS 4 INDEX: FIB4 SCORE: 0.45

## 2022-11-04 NOTE — PROGRESS NOTES
"Subjective:     CC:  Diagnoses of Elevated serum creatinine, Vitamin D deficiency, Hemochromatosis, unspecified hemochromatosis type, Lipoma of left upper extremity, Face lesion, Dyslipidemia, and Impaired fasting glucose were pertinent to this visit.    HISTORY OF THE PRESENT ILLNESS: Patient is a 36 y.o. male. This pleasant patient is here today to establish care.    Problem   Elevated Serum Creatinine    Elevated on lab work about 6 months ago, 1.6, GFR less than 60     Lipoma of Left Upper Extremity   Hemochromatosis    H63D heterozygous mutation    Previously followed with heme-onc, now stable with dietary modifications and donating blood.     Dyslipidemia    Lab Results   Component Value Date/Time    CHOLSTRLTOT 202 (H) 02/19/2021 07:57 AM     (H) 02/19/2021 07:57 AM    HDL 51 02/19/2021 07:57 AM    TRIGLYCERIDE 201 (H) 02/19/2021 07:57 AM                Face Lesion    Several round smooth raise lesions.      Transaminitis (Resolved)    Due to fatty liver and hemochromotosis   Ref. Range 6/22/2020 10:15   AST(SGOT) Latest Ref Range: 12 - 45 U/L 27   ALT(SGPT) Latest Ref Range: 2 - 50 U/L 51 (H)   Hepatitis panel unremarkable  Lifestyle modifications  Avoid ETOH         ROS:   ROS      Objective:     Exam: BP (!) 96/68 (BP Location: Left arm, Patient Position: Sitting, BP Cuff Size: Adult)   Pulse 74   Temp 36.7 °C (98 °F) (Temporal)   Resp 16   Ht 1.854 m (6' 1\")   Wt 108 kg (238 lb 1.6 oz)   SpO2 97%  Body mass index is 31.41 kg/m².    Physical Exam  Vitals reviewed.   Constitutional:       General: He is not in acute distress.     Appearance: He is not toxic-appearing.   HENT:      Head: Normocephalic and atraumatic.      Right Ear: External ear normal.      Left Ear: External ear normal.   Eyes:      General:         Right eye: No discharge.         Left eye: No discharge.      Extraocular Movements: Extraocular movements intact.      Conjunctiva/sclera: Conjunctivae normal.   Cardiovascular: "      Rate and Rhythm: Normal rate and regular rhythm.      Pulses: Normal pulses.      Heart sounds: Normal heart sounds. No murmur heard.  Pulmonary:      Effort: Pulmonary effort is normal. No respiratory distress.      Breath sounds: Normal breath sounds. No wheezing.   Abdominal:      General: Abdomen is flat. Bowel sounds are normal. There is no distension.      Palpations: Abdomen is soft.      Tenderness: There is no abdominal tenderness.   Musculoskeletal:      Cervical back: Normal range of motion.   Skin:     General: Skin is warm and dry.      Capillary Refill: Capillary refill takes less than 2 seconds.      Comments: 1.5 cm nontender, rubbery mobile nodule on the left upper extremity distal to the elbow.  Round raised skin colored lesion on the forehead.   Neurological:      Mental Status: He is alert.   Psychiatric:         Mood and Affect: Mood normal.         Behavior: Behavior normal.         Thought Content: Thought content normal.         Judgment: Judgment normal.         Assessment & Plan:   36 y.o. male with the following -    1. Elevated serum creatinine  Serum creatinine elevated, recheck today  - Comp Metabolic Panel; Future    2. Vitamin D deficiency  Previously low, patient is not on vit D supplement, recheck today  - VITAMIN D,25 HYDROXY (DEFICIENCY); Future    3. Hemochromatosis, unspecified hemochromatosis type  Patient it is stable and he is not following up with an oncologist, recheck ferritin level and if needed he should follow-up with oncology  - FERRITIN; Future    4. Lipoma of left upper extremity  Discussed the nature of a lipoma and that if he does want it removed I can send a referral to general surgery.  Patient does not want this referral at this time    5. Face lesion  Unsure what the face lesion is, although does not blatantly malignancy.  Referral to dermatology sent - Referral to Dermatology    6. Dyslipidemia  previously elevated, not on any medications,Counseled about  lifestyle changes in 2021, recheck  - Lipid Profile; Future    7. Impaired fasting glucose  - Comp Metabolic Panel; Future      Return in about 2 weeks (around 11/18/2022) for labs.    Please note that this dictation was created using voice recognition software. I have made every reasonable attempt to correct obvious errors, but I expect that there are errors of grammar and possibly content that I did not discover before finalizing the note.

## 2022-11-15 ENCOUNTER — APPOINTMENT (OUTPATIENT)
Dept: URGENT CARE | Facility: CLINIC | Age: 37
End: 2022-11-15
Payer: COMMERCIAL

## 2022-11-30 ENCOUNTER — APPOINTMENT (OUTPATIENT)
Dept: MEDICAL GROUP | Facility: MEDICAL CENTER | Age: 37
End: 2022-11-30
Payer: COMMERCIAL

## 2023-07-24 SDOH — ECONOMIC STABILITY: TRANSPORTATION INSECURITY
IN THE PAST 12 MONTHS, HAS THE LACK OF TRANSPORTATION KEPT YOU FROM MEDICAL APPOINTMENTS OR FROM GETTING MEDICATIONS?: NO

## 2023-07-24 SDOH — ECONOMIC STABILITY: HOUSING INSECURITY
IN THE LAST 12 MONTHS, WAS THERE A TIME WHEN YOU DID NOT HAVE A STEADY PLACE TO SLEEP OR SLEPT IN A SHELTER (INCLUDING NOW)?: NO

## 2023-07-24 SDOH — HEALTH STABILITY: PHYSICAL HEALTH: ON AVERAGE, HOW MANY MINUTES DO YOU ENGAGE IN EXERCISE AT THIS LEVEL?: 20 MIN

## 2023-07-24 SDOH — ECONOMIC STABILITY: HOUSING INSECURITY: IN THE LAST 12 MONTHS, HOW MANY PLACES HAVE YOU LIVED?: 1

## 2023-07-24 SDOH — HEALTH STABILITY: PHYSICAL HEALTH: ON AVERAGE, HOW MANY DAYS PER WEEK DO YOU ENGAGE IN MODERATE TO STRENUOUS EXERCISE (LIKE A BRISK WALK)?: 3 DAYS

## 2023-07-24 SDOH — HEALTH STABILITY: MENTAL HEALTH
STRESS IS WHEN SOMEONE FEELS TENSE, NERVOUS, ANXIOUS, OR CAN'T SLEEP AT NIGHT BECAUSE THEIR MIND IS TROUBLED. HOW STRESSED ARE YOU?: TO SOME EXTENT

## 2023-07-24 SDOH — ECONOMIC STABILITY: FOOD INSECURITY: WITHIN THE PAST 12 MONTHS, THE FOOD YOU BOUGHT JUST DIDN'T LAST AND YOU DIDN'T HAVE MONEY TO GET MORE.: NEVER TRUE

## 2023-07-24 SDOH — ECONOMIC STABILITY: FOOD INSECURITY: WITHIN THE PAST 12 MONTHS, YOU WORRIED THAT YOUR FOOD WOULD RUN OUT BEFORE YOU GOT MONEY TO BUY MORE.: NEVER TRUE

## 2023-07-24 SDOH — ECONOMIC STABILITY: INCOME INSECURITY: IN THE LAST 12 MONTHS, WAS THERE A TIME WHEN YOU WERE NOT ABLE TO PAY THE MORTGAGE OR RENT ON TIME?: NO

## 2023-07-24 SDOH — ECONOMIC STABILITY: INCOME INSECURITY: HOW HARD IS IT FOR YOU TO PAY FOR THE VERY BASICS LIKE FOOD, HOUSING, MEDICAL CARE, AND HEATING?: NOT HARD AT ALL

## 2023-07-24 SDOH — ECONOMIC STABILITY: TRANSPORTATION INSECURITY
IN THE PAST 12 MONTHS, HAS LACK OF RELIABLE TRANSPORTATION KEPT YOU FROM MEDICAL APPOINTMENTS, MEETINGS, WORK OR FROM GETTING THINGS NEEDED FOR DAILY LIVING?: NO

## 2023-07-24 SDOH — ECONOMIC STABILITY: TRANSPORTATION INSECURITY
IN THE PAST 12 MONTHS, HAS LACK OF TRANSPORTATION KEPT YOU FROM MEETINGS, WORK, OR FROM GETTING THINGS NEEDED FOR DAILY LIVING?: NO

## 2023-07-24 ASSESSMENT — SOCIAL DETERMINANTS OF HEALTH (SDOH)
WITHIN THE PAST 12 MONTHS, YOU WORRIED THAT YOUR FOOD WOULD RUN OUT BEFORE YOU GOT THE MONEY TO BUY MORE: NEVER TRUE
HOW OFTEN DO YOU ATTENT MEETINGS OF THE CLUB OR ORGANIZATION YOU BELONG TO?: NEVER
HOW OFTEN DO YOU ATTEND CHURCH OR RELIGIOUS SERVICES?: 1 TO 4 TIMES PER YEAR
HOW OFTEN DO YOU HAVE SIX OR MORE DRINKS ON ONE OCCASION: NEVER
HOW OFTEN DO YOU ATTEND CHURCH OR RELIGIOUS SERVICES?: 1 TO 4 TIMES PER YEAR
HOW OFTEN DO YOU HAVE A DRINK CONTAINING ALCOHOL: MONTHLY OR LESS
HOW HARD IS IT FOR YOU TO PAY FOR THE VERY BASICS LIKE FOOD, HOUSING, MEDICAL CARE, AND HEATING?: NOT HARD AT ALL
IN A TYPICAL WEEK, HOW MANY TIMES DO YOU TALK ON THE PHONE WITH FAMILY, FRIENDS, OR NEIGHBORS?: MORE THAN THREE TIMES A WEEK
IN A TYPICAL WEEK, HOW MANY TIMES DO YOU TALK ON THE PHONE WITH FAMILY, FRIENDS, OR NEIGHBORS?: MORE THAN THREE TIMES A WEEK
HOW MANY DRINKS CONTAINING ALCOHOL DO YOU HAVE ON A TYPICAL DAY WHEN YOU ARE DRINKING: 1 OR 2
HOW OFTEN DO YOU ATTENT MEETINGS OF THE CLUB OR ORGANIZATION YOU BELONG TO?: NEVER
DO YOU BELONG TO ANY CLUBS OR ORGANIZATIONS SUCH AS CHURCH GROUPS UNIONS, FRATERNAL OR ATHLETIC GROUPS, OR SCHOOL GROUPS?: NO
HOW OFTEN DO YOU GET TOGETHER WITH FRIENDS OR RELATIVES?: ONCE A WEEK
HOW OFTEN DO YOU GET TOGETHER WITH FRIENDS OR RELATIVES?: ONCE A WEEK
DO YOU BELONG TO ANY CLUBS OR ORGANIZATIONS SUCH AS CHURCH GROUPS UNIONS, FRATERNAL OR ATHLETIC GROUPS, OR SCHOOL GROUPS?: NO

## 2023-07-24 ASSESSMENT — LIFESTYLE VARIABLES
SKIP TO QUESTIONS 9-10: 1
HOW OFTEN DO YOU HAVE SIX OR MORE DRINKS ON ONE OCCASION: NEVER
HOW OFTEN DO YOU HAVE A DRINK CONTAINING ALCOHOL: MONTHLY OR LESS
HOW MANY STANDARD DRINKS CONTAINING ALCOHOL DO YOU HAVE ON A TYPICAL DAY: 1 OR 2
AUDIT-C TOTAL SCORE: 1

## 2023-07-27 ENCOUNTER — OFFICE VISIT (OUTPATIENT)
Dept: MEDICAL GROUP | Facility: MEDICAL CENTER | Age: 38
End: 2023-07-27
Payer: COMMERCIAL

## 2023-07-27 ENCOUNTER — HOSPITAL ENCOUNTER (OUTPATIENT)
Dept: LAB | Facility: MEDICAL CENTER | Age: 38
End: 2023-07-27
Attending: NURSE PRACTITIONER
Payer: COMMERCIAL

## 2023-07-27 VITALS
HEIGHT: 72 IN | OXYGEN SATURATION: 95 % | DIASTOLIC BLOOD PRESSURE: 74 MMHG | WEIGHT: 248 LBS | TEMPERATURE: 97 F | HEART RATE: 74 BPM | SYSTOLIC BLOOD PRESSURE: 118 MMHG | BODY MASS INDEX: 33.59 KG/M2

## 2023-07-27 DIAGNOSIS — Z00.00 ANNUAL PHYSICAL EXAM: ICD-10-CM

## 2023-07-27 DIAGNOSIS — Z30.09 VASECTOMY EVALUATION: ICD-10-CM

## 2023-07-27 DIAGNOSIS — E83.119 HEMOCHROMATOSIS, UNSPECIFIED HEMOCHROMATOSIS TYPE: ICD-10-CM

## 2023-07-27 DIAGNOSIS — R73.01 IMPAIRED FASTING GLUCOSE: ICD-10-CM

## 2023-07-27 DIAGNOSIS — E78.5 DYSLIPIDEMIA: ICD-10-CM

## 2023-07-27 DIAGNOSIS — E66.9 OBESITY (BMI 30-39.9): ICD-10-CM

## 2023-07-27 DIAGNOSIS — E55.9 VITAMIN D DEFICIENCY: ICD-10-CM

## 2023-07-27 LAB
25(OH)D3 SERPL-MCNC: 20 NG/ML (ref 30–100)
ALBUMIN SERPL BCP-MCNC: 4.2 G/DL (ref 3.2–4.9)
ALBUMIN/GLOB SERPL: 1.3 G/DL
ALP SERPL-CCNC: 59 U/L (ref 30–99)
ALT SERPL-CCNC: 30 U/L (ref 2–50)
ANION GAP SERPL CALC-SCNC: 12 MMOL/L (ref 7–16)
AST SERPL-CCNC: 18 U/L (ref 12–45)
BASOPHILS # BLD AUTO: 0.7 % (ref 0–1.8)
BASOPHILS # BLD: 0.06 K/UL (ref 0–0.12)
BILIRUB SERPL-MCNC: 0.7 MG/DL (ref 0.1–1.5)
BUN SERPL-MCNC: 15 MG/DL (ref 8–22)
CALCIUM ALBUM COR SERPL-MCNC: 9.3 MG/DL (ref 8.5–10.5)
CALCIUM SERPL-MCNC: 9.5 MG/DL (ref 8.4–10.2)
CHLORIDE SERPL-SCNC: 103 MMOL/L (ref 96–112)
CHOLEST SERPL-MCNC: 211 MG/DL (ref 100–199)
CO2 SERPL-SCNC: 24 MMOL/L (ref 20–33)
CREAT SERPL-MCNC: 1.16 MG/DL (ref 0.5–1.4)
EOSINOPHIL # BLD AUTO: 0.15 K/UL (ref 0–0.51)
EOSINOPHIL NFR BLD: 1.9 % (ref 0–6.9)
ERYTHROCYTE [DISTWIDTH] IN BLOOD BY AUTOMATED COUNT: 39.9 FL (ref 35.9–50)
EST. AVERAGE GLUCOSE BLD GHB EST-MCNC: 103 MG/DL
FASTING STATUS PATIENT QL REPORTED: NORMAL
FERRITIN SERPL-MCNC: 265 NG/ML (ref 22–322)
GFR SERPLBLD CREATININE-BSD FMLA CKD-EPI: 83 ML/MIN/1.73 M 2
GLOBULIN SER CALC-MCNC: 3.3 G/DL (ref 1.9–3.5)
GLUCOSE SERPL-MCNC: 94 MG/DL (ref 65–99)
HBA1C MFR BLD: 5.2 % (ref 4–5.6)
HCT VFR BLD AUTO: 47.2 % (ref 42–52)
HDLC SERPL-MCNC: 51 MG/DL
HGB BLD-MCNC: 16.8 G/DL (ref 14–18)
IMM GRANULOCYTES # BLD AUTO: 0.02 K/UL (ref 0–0.11)
IMM GRANULOCYTES NFR BLD AUTO: 0.2 % (ref 0–0.9)
IRON SATN MFR SERPL: 44 % (ref 15–55)
IRON SERPL-MCNC: 112 UG/DL (ref 50–180)
LDLC SERPL CALC-MCNC: 124 MG/DL
LYMPHOCYTES # BLD AUTO: 3.11 K/UL (ref 1–4.8)
LYMPHOCYTES NFR BLD: 38.7 % (ref 22–41)
MCH RBC QN AUTO: 31.3 PG (ref 27–33)
MCHC RBC AUTO-ENTMCNC: 35.6 G/DL (ref 32.3–36.5)
MCV RBC AUTO: 87.9 FL (ref 81.4–97.8)
MONOCYTES # BLD AUTO: 0.88 K/UL (ref 0–0.85)
MONOCYTES NFR BLD AUTO: 11 % (ref 0–13.4)
NEUTROPHILS # BLD AUTO: 3.81 K/UL (ref 1.82–7.42)
NEUTROPHILS NFR BLD: 47.5 % (ref 44–72)
NRBC # BLD AUTO: 0 K/UL
NRBC BLD-RTO: 0 /100 WBC (ref 0–0.2)
PLATELET # BLD AUTO: 202 K/UL (ref 164–446)
PMV BLD AUTO: 10.9 FL (ref 9–12.9)
POTASSIUM SERPL-SCNC: 4 MMOL/L (ref 3.6–5.5)
PROT SERPL-MCNC: 7.5 G/DL (ref 6–8.2)
RBC # BLD AUTO: 5.37 M/UL (ref 4.7–6.1)
SODIUM SERPL-SCNC: 139 MMOL/L (ref 135–145)
TIBC SERPL-MCNC: 256 UG/DL (ref 250–450)
TRIGL SERPL-MCNC: 181 MG/DL (ref 0–149)
TSH SERPL DL<=0.005 MIU/L-ACNC: 2.17 UIU/ML (ref 0.38–5.33)
UIBC SERPL-MCNC: 144 UG/DL (ref 110–370)
WBC # BLD AUTO: 8 K/UL (ref 4.8–10.8)

## 2023-07-27 PROCEDURE — 83550 IRON BINDING TEST: CPT

## 2023-07-27 PROCEDURE — 82306 VITAMIN D 25 HYDROXY: CPT

## 2023-07-27 PROCEDURE — 80053 COMPREHEN METABOLIC PANEL: CPT

## 2023-07-27 PROCEDURE — 36415 COLL VENOUS BLD VENIPUNCTURE: CPT

## 2023-07-27 PROCEDURE — 80061 LIPID PANEL: CPT

## 2023-07-27 PROCEDURE — 85025 COMPLETE CBC W/AUTO DIFF WBC: CPT

## 2023-07-27 PROCEDURE — 82728 ASSAY OF FERRITIN: CPT

## 2023-07-27 PROCEDURE — 3074F SYST BP LT 130 MM HG: CPT | Performed by: NURSE PRACTITIONER

## 2023-07-27 PROCEDURE — 83036 HEMOGLOBIN GLYCOSYLATED A1C: CPT

## 2023-07-27 PROCEDURE — 3078F DIAST BP <80 MM HG: CPT | Performed by: NURSE PRACTITIONER

## 2023-07-27 PROCEDURE — 83540 ASSAY OF IRON: CPT

## 2023-07-27 PROCEDURE — 99395 PREV VISIT EST AGE 18-39: CPT | Performed by: NURSE PRACTITIONER

## 2023-07-27 PROCEDURE — 84443 ASSAY THYROID STIM HORMONE: CPT

## 2023-07-27 ASSESSMENT — PATIENT HEALTH QUESTIONNAIRE - PHQ9: CLINICAL INTERPRETATION OF PHQ2 SCORE: 0

## 2023-07-27 ASSESSMENT — FIBROSIS 4 INDEX: FIB4 SCORE: 0.46

## 2023-07-27 NOTE — ASSESSMENT & PLAN NOTE
Social/Family: , 1 child  Work: Commercial real estate, property management  Diet: Take out 3-4 days per week, vegetables daily. Carbs/sugar mod to high  Caffeine/Energy Drinks/Soda: Soda once per month  Exercise: Walking, 84710 steps per day. No aerobic or resistance  Stress: Normal life stress on average  Sleep: 6 hours per night,   Depression/Anxiety Concerns: No concerns.

## 2023-07-27 NOTE — ASSESSMENT & PLAN NOTE
H63D heterozygous mutation    Previously followed with heme-onc, now stable with dietary modifications and donating blood however he has not donated in quite some time. Plans to restart

## 2023-07-27 NOTE — PROGRESS NOTES
Franco Meléndez is a 37 y.o. male here to establish care and routine annual physical    HPI:    Annual physical exam  Social/Family: , 1 child  Work: Commercial real estate, property management  Diet: Take out 3-4 days per week, vegetables daily. Carbs/sugar mod to high  Caffeine/Energy Drinks/Soda: Soda once per month  Exercise: Walking, 54080 steps per day. No aerobic or resistance  Stress: Normal life stress on average  Sleep: 6 hours per night,   Depression/Anxiety Concerns: No concerns.       Hemochromatosis  H63D heterozygous mutation    Previously followed with heme-onc, now stable with dietary modifications and donating blood however he has not donated in quite some time. Plans to restart    Current medicines (including changes today)  No current outpatient medications on file.     No current facility-administered medications for this visit.     He  has a past medical history of Colon polyps.  He  has a past surgical history that includes hernia repair and tonsillectomy and adenoidectomy.  Social History     Tobacco Use    Smoking status: Never    Smokeless tobacco: Never   Vaping Use    Vaping Use: Never used   Substance Use Topics    Alcohol use: Yes     Comment: Approx once a month    Drug use: No     Social History     Social History Narrative    Not on file     Family History   Problem Relation Age of Onset    No Known Problems Mother     No Known Problems Father     No Known Problems Maternal Grandmother     No Known Problems Maternal Grandfather     Cancer Paternal Grandfather         lung cancer, smoking    Cancer Other     Colorectal Cancer Neg Hx     Breast Cancer Neg Hx     Ovarian Cancer Neg Hx     Tubal Cancer Neg Hx     Peritoneal Cancer Neg Hx      Family Status   Relation Name Status    Mo  (Not Specified)    Fa  (Not Specified)    MGMo  (Not Specified)    MGFa  (Not Specified)    PGFa  (Not Specified)    OTHER  (Not Specified)    Neg Hx  (Not Specified)         ROS  No chest pain, no  abdominal pain, no rash.  Positive ROS as per HPI.  All other systems reviewed and are negative      Objective:     /74   Pulse 74   Temp 36.1 °C (97 °F) (Temporal)   Ht 1.829 m (6')   Wt 112 kg (248 lb)   SpO2 95%  Body mass index is 33.63 kg/m².  Physical Exam:    Constitutional: Alert, no distress.  Skin: Warm, dry, good turgor, no rashes in visible areas.  Eye: Equal, round and reactive, conjunctiva clear, lids normal.  ENMT: Lips without lesions, good dentition, oropharynx clear.  Neck: Trachea midline, no masses, no thyromegaly. No cervical or supraclavicular lymphadenopathy.  Respiratory: Unlabored respiratory effort, lungs clear to auscultation, no wheezes, no ronchi.  Cardiovascular: Normal S1, S2, no murmur, no edema.  Abdomen: Soft, non-tender, no masses, no hepatosplenomegaly.  Psych: Alert and oriented x3, normal affect and mood.        Assessment and Plan:   The following treatment plan was discussed    1. Annual physical exam  Annual labs ordered and health maintenance reviewed and updated.   Patient and I discussed the importance of lifestyle changes, with particular emphasis on decreasing sugar and carbohydrate intake and increasing plant-based nutrition (for the purposes of weight loss, general health, and prevention of chronic illnesses), as well as regular cardiovascular exercise, proper sleep, and stress management. Patient verbalized understanding.    - CBC WITH DIFFERENTIAL; Future  - Comp Metabolic Panel; Future  - VITAMIN D,25 HYDROXY (DEFICIENCY); Future  - FERRITIN; Future  - IRON/TOTAL IRON BIND; Future  - Lipid Profile; Future  - HEMOGLOBIN A1C; Future  - TSH WITH REFLEX TO FT4; Future    2. Vasectomy evaluation  - Referral to Urology    3. Vitamin D deficiency  - VITAMIN D,25 HYDROXY (DEFICIENCY); Future    4. Hemochromatosis, unspecified hemochromatosis type  - FERRITIN; Future  - IRON/TOTAL IRON BIND; Future    5. Impaired fasting glucose    - HEMOGLOBIN A1C; Future    6.  Dyslipidemia    - Lipid Profile; Future      Records requested.  Followup: Return in about 4 weeks (around 8/24/2023) for Results Review.    The MA is performing the above orders under the direction of Dr. Mckeon    Please note that this dictation was created using voice recognition software. I have made every reasonable attempt to correct obvious errors, but I expect that there are errors of grammar and possibly content that I did not discover before finalizing the note.

## 2023-09-18 ENCOUNTER — TELEMEDICINE (OUTPATIENT)
Dept: MEDICAL GROUP | Facility: MEDICAL CENTER | Age: 38
End: 2023-09-18
Payer: COMMERCIAL

## 2023-09-18 DIAGNOSIS — E55.9 VITAMIN D DEFICIENCY: ICD-10-CM

## 2023-09-18 DIAGNOSIS — E83.110 HEREDITARY HEMOCHROMATOSIS (HCC): ICD-10-CM

## 2023-09-18 DIAGNOSIS — R73.01 IMPAIRED FASTING GLUCOSE: ICD-10-CM

## 2023-09-18 DIAGNOSIS — E78.5 DYSLIPIDEMIA: ICD-10-CM

## 2023-09-18 PROCEDURE — 99214 OFFICE O/P EST MOD 30 MIN: CPT | Mod: 95 | Performed by: NURSE PRACTITIONER

## 2023-09-18 NOTE — ASSESSMENT & PLAN NOTE
H63D heterozygous mutation    Previously followed with heme-onc, now stable with dietary modifications and donating blood however he has not donated in quite some time.  Virtual visit today to review CBC and iron panel

## 2023-09-18 NOTE — ASSESSMENT & PLAN NOTE
Chronic problem, LDL slightly worse and triglycerides slightly improved on recent labs.  Working on making dietary modifications.  No family history of heart disease.

## 2023-09-18 NOTE — PROGRESS NOTES
Telemedicine: Established Patient   This evaluation was conducted via Zoom using secure and encrypted videoconferencing technology. The patient was in their home in the [facility].    The patient's identity was confirmed and verbal consent was obtained for this virtual visit.    Subjective:   CC: Lab review  Franco Meléndez is a 37 y.o. male presenting for evaluation and management of:    Dyslipidemia  Chronic problem, LDL slightly worse and triglycerides slightly improved on recent labs.  Working on making dietary modifications.  No family history of heart disease.    Hemochromatosis  H63D heterozygous mutation    Previously followed with heme-onc, now stable with dietary modifications and donating blood however he has not donated in quite some time.  Virtual visit today to review CBC and iron panel    Impaired fasting glucose  Resolved with dietary changes, fasting glucose and A1c is normal.    Vitamin D deficiency  Chronic.  Recent labs show vitamin D of 20.  Not currently taking a supplement.       ROS  Positive ROS as per HPI. All other systems reviewed and are negative.    Allergies   Allergen Reactions    Azithromycin      Pt reports that he gets an upset stomach        Current medicines (including changes today)  No current outpatient medications on file.     No current facility-administered medications for this visit.       Patient Active Problem List    Diagnosis Date Noted    Annual physical exam 07/27/2023    Elevated serum creatinine 11/04/2022    Lipoma of left upper extremity 11/04/2022    Hemochromatosis 07/16/2020    Fatty liver 07/16/2020    Vitamin D deficiency 06/29/2020    Impaired fasting glucose 06/29/2020    Dyslipidemia 06/29/2020    Major depressive disorder 06/19/2020    Face lesion 06/19/2020    Cutaneous skin tags 06/19/2020    Obesity (BMI 30-39.9) 06/19/2020       Family History   Problem Relation Age of Onset    No Known Problems Mother     No Known Problems Father     No Known  Problems Maternal Grandmother     No Known Problems Maternal Grandfather     Cancer Paternal Grandfather         lung cancer, smoking    Cancer Other     Colorectal Cancer Neg Hx     Breast Cancer Neg Hx     Ovarian Cancer Neg Hx     Tubal Cancer Neg Hx     Peritoneal Cancer Neg Hx        He  has a past medical history of Colon polyps.  He  has a past surgical history that includes hernia repair and tonsillectomy and adenoidectomy.       Objective:   There were no vitals taken for this visit.    Physical Exam:  Constitutional: Alert, no distress, well-groomed.  Skin: No rashes in visible areas.  Eye: Round. Conjunctiva clear, lids normal. No icterus.   ENMT: Lips pink without lesions, good dentition, moist mucous membranes. Phonation normal.  Neck: No masses, no thyromegaly. Moves freely without pain.  CV: Pulse as reported by patient  Respiratory: Unlabored respiratory effort, no cough or audible wheeze  Psych: Alert and oriented x3, normal affect and mood.       Assessment and Plan:   The following treatment plan was discussed:     1. Dyslipidemia  Unstable, dietary changes discussed    2. Hereditary hemochromatosis (HCC)  Stable with dietary modifications, continue to monitor annually  No need for blood donation at this time    3. Impaired fasting glucose  Stable with dietary modifications    4. Vitamin D deficiency  Unstable, start vitamin D3 supplement 2000 IU daily.      Follow-up: Return in about 1 year (around 9/18/2024).

## 2023-10-23 ENCOUNTER — OFFICE VISIT (OUTPATIENT)
Dept: MEDICAL GROUP | Facility: MEDICAL CENTER | Age: 38
End: 2023-10-23
Payer: COMMERCIAL

## 2023-10-23 VITALS
HEART RATE: 88 BPM | OXYGEN SATURATION: 95 % | DIASTOLIC BLOOD PRESSURE: 65 MMHG | TEMPERATURE: 98 F | BODY MASS INDEX: 31.33 KG/M2 | WEIGHT: 252 LBS | SYSTOLIC BLOOD PRESSURE: 100 MMHG | HEIGHT: 75 IN

## 2023-10-23 DIAGNOSIS — K62.5 BRBPR (BRIGHT RED BLOOD PER RECTUM): ICD-10-CM

## 2023-10-23 DIAGNOSIS — K64.8 INTERNAL HEMORRHOID, BLEEDING: ICD-10-CM

## 2023-10-23 PROCEDURE — 3074F SYST BP LT 130 MM HG: CPT | Performed by: NURSE PRACTITIONER

## 2023-10-23 PROCEDURE — 99214 OFFICE O/P EST MOD 30 MIN: CPT | Performed by: NURSE PRACTITIONER

## 2023-10-23 PROCEDURE — 3078F DIAST BP <80 MM HG: CPT | Performed by: NURSE PRACTITIONER

## 2023-10-23 RX ORDER — HYDROCORTISONE ACETATE 25 MG/1
25 SUPPOSITORY RECTAL EVERY 12 HOURS
Qty: 14 SUPPOSITORY | Refills: 0 | Status: SHIPPED | OUTPATIENT
Start: 2023-10-23 | End: 2023-10-30

## 2023-10-23 ASSESSMENT — FIBROSIS 4 INDEX: FIB4 SCORE: 0.59

## 2023-10-23 NOTE — ASSESSMENT & PLAN NOTE
Started 3-4 days ago after bowel movement with pink tinged toilet water, bright red blood on TP, and stool coated with blood.     Stool didn't seem to be mixed with blood. No diarrhea or loose stools. Denies constipation. May be feeling a little nausea.     Has had this in the past, usually every 6-12 months, diagnosed with internal hemorrhoids in the past. Has seen GI in the past for this. Does get occasional rectal itching.     History of colonic polyps, next colonoscopy due next year.     No dietary changes. No NSAID use. Alcohol once per month or less. Doesn't spend long amounts of time on toilet. No changes in BMs.

## 2023-11-01 NOTE — PROGRESS NOTES
"Subjective:   Franco Meléndez is a 37 y.o. male here today for rectal bleeding    BRBPR (bright red blood per rectum)  Started 3-4 days ago after bowel movement with pink tinged toilet water, bright red blood on TP, and stool coated with blood.     Stool didn't seem to be mixed with blood. No diarrhea or loose stools. Denies constipation. May be feeling a little nausea.     Has had this in the past, usually every 6-12 months, diagnosed with internal hemorrhoids in the past. Has seen GI in the past for this. Does get occasional rectal itching.     History of colonic polyps, next colonoscopy due next year.     No dietary changes. No NSAID use. Alcohol once per month or less. Doesn't spend long amounts of time on toilet. No changes in BMs.      Current medicines (including changes today)  No current outpatient medications on file.     No current facility-administered medications for this visit.     He  has a past medical history of Colon polyps.    ROS   No chest pain, no shortness of breath, no abdominal pain  Positive ROS as per HPI.  All other systems reviewed and are negative.     Objective:     /65   Pulse 88   Temp 36.7 °C (98 °F) (Temporal)   Ht 1.9 m (6' 2.8\")   Wt 114 kg (252 lb)   SpO2 95%  Body mass index is 31.66 kg/m².     Physical Exam:  Constitutional: Alert, no distress.  Skin: Warm, dry, good turgor, no rashes in visible areas.  Eye: Equal, round and reactive, conjunctiva clear, lids normal.  ENMT: Lips without lesions, good dentition  Respiratory: Unlabored respiratory effort, lungs clear to auscultation, no wheezes, no ronchi.  Cardiovascular: Normal S1, S2, no murmur, no edema.  Abdomen: Soft, non-tender, no masses, no hepatosplenomegaly.  Psych: Alert and oriented x3, normal affect and mood.      Assessment and Plan:   The following treatment plan was discussed    1. BRBPR (bright red blood per rectum)  Unstable  Likely flare of known internal hemorrhoids  Trial Anusol BID for 7 " days  Referral placed to GI Urgently  - Referral to Gastroenterology    2. Internal hemorrhoid, bleeding  Unstable, as above  - Referral to Gastroenterology  - hydrocortisone (ANUSOL-HC) 25 MG Suppos; Insert 1 Suppository into the rectum every 12 hours for 7 days.  Dispense: 14 Suppository; Refill: 0      Followup: Return if symptoms worsen or fail to improve.    The MA is performing the above orders under the direction of Dr. Mckeon    Please note that this dictation was created using voice recognition software. I have made every reasonable attempt to correct obvious errors, but I expect that there are errors of grammar and possibly content that I did not discover before finalizing the note.

## 2023-12-14 ENCOUNTER — OFFICE VISIT (OUTPATIENT)
Dept: MEDICAL GROUP | Facility: MEDICAL CENTER | Age: 38
End: 2023-12-14
Payer: COMMERCIAL

## 2023-12-14 VITALS
HEART RATE: 78 BPM | WEIGHT: 252 LBS | OXYGEN SATURATION: 98 % | TEMPERATURE: 97 F | HEIGHT: 73 IN | BODY MASS INDEX: 33.4 KG/M2 | SYSTOLIC BLOOD PRESSURE: 110 MMHG | DIASTOLIC BLOOD PRESSURE: 62 MMHG

## 2023-12-14 DIAGNOSIS — J32.0 CHRONIC MAXILLARY SINUSITIS: ICD-10-CM

## 2023-12-14 PROBLEM — J32.9 CHRONIC CONGESTION OF PARANASAL SINUS: Status: ACTIVE | Noted: 2023-12-14

## 2023-12-14 PROCEDURE — 3074F SYST BP LT 130 MM HG: CPT | Performed by: NURSE PRACTITIONER

## 2023-12-14 PROCEDURE — 99214 OFFICE O/P EST MOD 30 MIN: CPT | Performed by: NURSE PRACTITIONER

## 2023-12-14 PROCEDURE — 3078F DIAST BP <80 MM HG: CPT | Performed by: NURSE PRACTITIONER

## 2023-12-14 RX ORDER — DOXYCYCLINE HYCLATE 100 MG
100 TABLET ORAL 2 TIMES DAILY
Qty: 14 TABLET | Refills: 0 | Status: SHIPPED | OUTPATIENT
Start: 2023-12-14 | End: 2023-12-21

## 2023-12-14 RX ORDER — FLUTICASONE PROPIONATE 50 MCG
1 SPRAY, SUSPENSION (ML) NASAL DAILY
Qty: 16 G | Refills: 0 | Status: SHIPPED | OUTPATIENT
Start: 2023-12-14

## 2023-12-14 ASSESSMENT — FIBROSIS 4 INDEX: FIB4 SCORE: 0.59

## 2023-12-14 NOTE — ASSESSMENT & PLAN NOTE
Patient reports that he had a bad sinus infection about a year ago. Since then he has been having a lot thicker nasal drainage, sinus congestion.     Feels like it has worsened lately, he is having trouble sleeping at night, more sinus congestion, drainage.     Uses a nasal spray, all natural. He has been using a humidifier for this which helped a little.     Denies fevers, chills, body aches, sinus pain, teeth pain, ear pain/pressure.

## 2023-12-31 NOTE — PROGRESS NOTES
"Subjective:   Franco Meléndez is a 38 y.o. male here today for sinus congestion    Chronic congestion of paranasal sinus  Patient reports that he had a bad sinus infection about a year ago. Since then he has been having a lot thicker nasal drainage, sinus congestion.     Feels like it has worsened lately, he is having trouble sleeping at night, more sinus congestion, drainage.     Uses a nasal spray, all natural. He has been using a humidifier for this which helped a little.     Denies fevers, chills, body aches, sinus pain, teeth pain, ear pain/pressure.      Current medicines (including changes today)  Current Outpatient Medications   Medication Sig Dispense Refill    fluticasone (FLONASE) 50 MCG/ACT nasal spray Administer 1 Spray into affected nostril(S) every day. 16 g 0     No current facility-administered medications for this visit.     He  has a past medical history of Colon polyps.    ROS   No chest pain, no shortness of breath, no abdominal pain  Positive ROS as per HPI.  All other systems reviewed and are negative.     Objective:     /62   Pulse 78   Temp 36.1 °C (97 °F) (Temporal)   Ht 1.854 m (6' 1\")   Wt 114 kg (252 lb)   SpO2 98%  Body mass index is 33.25 kg/m².     Physical Exam:  Constitutional: Alert, no distress.  Skin: Warm, dry, good turgor, no rashes in visible areas.  Eye: Equal, round and reactive, conjunctiva clear, lids normal.  ENMT: Lips without lesions, good dentition, oropharynx clear. +mod to severe nasal mucosal thickening with thick nasal drainage present, tenderness to palpation of maxillary sinuses  Neck: Trachea midline, no masses, no thyromegaly. No cervical or supraclavicular lymphadenopathy  Respiratory: Unlabored respiratory effort, lungs clear to auscultation, no wheezes, no ronchi.  Cardiovascular: Normal S1, S2, no murmur, no edema.  Psych: Alert and oriented x3, normal affect and mood.        Assessment and Plan:   The following treatment plan was " discussed    1. Chronic maxillary sinusitis  Unstable  Likely chronic sinus congestion resulting in acute infection  Start doxy BID for 7 days and flonase 1-2 times daily for 1-2 weeks.   - doxycycline (VIBRAMYCIN) 100 MG Tab; Take 1 Tablet by mouth 2 times a day for 7 days.  Dispense: 14 Tablet; Refill: 0  - fluticasone (FLONASE) 50 MCG/ACT nasal spray; Administer 1 Spray into affected nostril(S) every day.  Dispense: 16 g; Refill: 0      Followup: Return if symptoms worsen or fail to improve.    The MA is performing the above orders under the direction of Dr. Mckeon    Please note that this dictation was created using voice recognition software. I have made every reasonable attempt to correct obvious errors, but I expect that there are errors of grammar and possibly content that I did not discover before finalizing the note.

## 2024-07-16 SDOH — HEALTH STABILITY: PHYSICAL HEALTH: ON AVERAGE, HOW MANY DAYS PER WEEK DO YOU ENGAGE IN MODERATE TO STRENUOUS EXERCISE (LIKE A BRISK WALK)?: 5 DAYS

## 2024-07-16 SDOH — ECONOMIC STABILITY: FOOD INSECURITY: WITHIN THE PAST 12 MONTHS, THE FOOD YOU BOUGHT JUST DIDN'T LAST AND YOU DIDN'T HAVE MONEY TO GET MORE.: NEVER TRUE

## 2024-07-16 SDOH — ECONOMIC STABILITY: FOOD INSECURITY: WITHIN THE PAST 12 MONTHS, YOU WORRIED THAT YOUR FOOD WOULD RUN OUT BEFORE YOU GOT MONEY TO BUY MORE.: SOMETIMES TRUE

## 2024-07-16 SDOH — ECONOMIC STABILITY: INCOME INSECURITY: HOW HARD IS IT FOR YOU TO PAY FOR THE VERY BASICS LIKE FOOD, HOUSING, MEDICAL CARE, AND HEATING?: NOT VERY HARD

## 2024-07-16 SDOH — HEALTH STABILITY: PHYSICAL HEALTH: ON AVERAGE, HOW MANY MINUTES DO YOU ENGAGE IN EXERCISE AT THIS LEVEL?: 40 MIN

## 2024-07-16 SDOH — ECONOMIC STABILITY: INCOME INSECURITY: IN THE LAST 12 MONTHS, WAS THERE A TIME WHEN YOU WERE NOT ABLE TO PAY THE MORTGAGE OR RENT ON TIME?: NO

## 2024-07-16 SDOH — ECONOMIC STABILITY: HOUSING INSECURITY: IN THE LAST 12 MONTHS, HOW MANY PLACES HAVE YOU LIVED?: 1

## 2024-07-16 SDOH — HEALTH STABILITY: MENTAL HEALTH
STRESS IS WHEN SOMEONE FEELS TENSE, NERVOUS, ANXIOUS, OR CAN'T SLEEP AT NIGHT BECAUSE THEIR MIND IS TROUBLED. HOW STRESSED ARE YOU?: ONLY A LITTLE

## 2024-07-16 ASSESSMENT — SOCIAL DETERMINANTS OF HEALTH (SDOH)
HOW HARD IS IT FOR YOU TO PAY FOR THE VERY BASICS LIKE FOOD, HOUSING, MEDICAL CARE, AND HEATING?: NOT VERY HARD
IN A TYPICAL WEEK, HOW MANY TIMES DO YOU TALK ON THE PHONE WITH FAMILY, FRIENDS, OR NEIGHBORS?: MORE THAN THREE TIMES A WEEK
DO YOU BELONG TO ANY CLUBS OR ORGANIZATIONS SUCH AS CHURCH GROUPS UNIONS, FRATERNAL OR ATHLETIC GROUPS, OR SCHOOL GROUPS?: NO
HOW OFTEN DO YOU ATTEND CHURCH OR RELIGIOUS SERVICES?: NEVER
DO YOU BELONG TO ANY CLUBS OR ORGANIZATIONS SUCH AS CHURCH GROUPS UNIONS, FRATERNAL OR ATHLETIC GROUPS, OR SCHOOL GROUPS?: NO
HOW OFTEN DO YOU HAVE SIX OR MORE DRINKS ON ONE OCCASION: NEVER
IN A TYPICAL WEEK, HOW MANY TIMES DO YOU TALK ON THE PHONE WITH FAMILY, FRIENDS, OR NEIGHBORS?: MORE THAN THREE TIMES A WEEK
HOW OFTEN DO YOU GET TOGETHER WITH FRIENDS OR RELATIVES?: MORE THAN THREE TIMES A WEEK
HOW OFTEN DO YOU HAVE A DRINK CONTAINING ALCOHOL: MONTHLY OR LESS
HOW OFTEN DO YOU ATTENT MEETINGS OF THE CLUB OR ORGANIZATION YOU BELONG TO?: NEVER
HOW OFTEN DO YOU ATTEND CHURCH OR RELIGIOUS SERVICES?: NEVER
HOW OFTEN DO YOU ATTENT MEETINGS OF THE CLUB OR ORGANIZATION YOU BELONG TO?: NEVER
HOW MANY DRINKS CONTAINING ALCOHOL DO YOU HAVE ON A TYPICAL DAY WHEN YOU ARE DRINKING: 1 OR 2
WITHIN THE PAST 12 MONTHS, YOU WORRIED THAT YOUR FOOD WOULD RUN OUT BEFORE YOU GOT THE MONEY TO BUY MORE: SOMETIMES TRUE
IN THE PAST 12 MONTHS, HAS THE ELECTRIC, GAS, OIL, OR WATER COMPANY THREATENED TO SHUT OFF SERVICE IN YOUR HOME?: NO
HOW OFTEN DO YOU GET TOGETHER WITH FRIENDS OR RELATIVES?: MORE THAN THREE TIMES A WEEK

## 2024-07-16 ASSESSMENT — LIFESTYLE VARIABLES
SKIP TO QUESTIONS 9-10: 1
AUDIT-C TOTAL SCORE: 1
HOW OFTEN DO YOU HAVE SIX OR MORE DRINKS ON ONE OCCASION: NEVER
HOW MANY STANDARD DRINKS CONTAINING ALCOHOL DO YOU HAVE ON A TYPICAL DAY: 1 OR 2
HOW OFTEN DO YOU HAVE A DRINK CONTAINING ALCOHOL: MONTHLY OR LESS

## 2024-07-19 ENCOUNTER — RESEARCH ENCOUNTER (OUTPATIENT)
Dept: RESEARCH | Facility: MEDICAL CENTER | Age: 39
End: 2024-07-19

## 2024-07-19 ENCOUNTER — OFFICE VISIT (OUTPATIENT)
Dept: MEDICAL GROUP | Facility: MEDICAL CENTER | Age: 39
End: 2024-07-19
Payer: COMMERCIAL

## 2024-07-19 VITALS
SYSTOLIC BLOOD PRESSURE: 106 MMHG | BODY MASS INDEX: 31.46 KG/M2 | TEMPERATURE: 97.5 F | OXYGEN SATURATION: 96 % | HEART RATE: 97 BPM | HEIGHT: 75 IN | WEIGHT: 253 LBS | DIASTOLIC BLOOD PRESSURE: 50 MMHG

## 2024-07-19 DIAGNOSIS — K63.5 COLON POLYPOSIS: ICD-10-CM

## 2024-07-19 DIAGNOSIS — E78.5 DYSLIPIDEMIA: ICD-10-CM

## 2024-07-19 DIAGNOSIS — Z00.00 ANNUAL PHYSICAL EXAM: ICD-10-CM

## 2024-07-19 DIAGNOSIS — E66.9 OBESITY (BMI 30-39.9): ICD-10-CM

## 2024-07-19 DIAGNOSIS — R73.01 IMPAIRED FASTING GLUCOSE: ICD-10-CM

## 2024-07-19 DIAGNOSIS — Z00.6 RESEARCH STUDY PATIENT: ICD-10-CM

## 2024-07-19 DIAGNOSIS — E55.9 VITAMIN D DEFICIENCY: ICD-10-CM

## 2024-07-19 PROCEDURE — 3078F DIAST BP <80 MM HG: CPT | Performed by: NURSE PRACTITIONER

## 2024-07-19 PROCEDURE — 3074F SYST BP LT 130 MM HG: CPT | Performed by: NURSE PRACTITIONER

## 2024-07-19 PROCEDURE — 99395 PREV VISIT EST AGE 18-39: CPT | Performed by: NURSE PRACTITIONER

## 2024-07-19 ASSESSMENT — FIBROSIS 4 INDEX: FIB4 SCORE: 0.6

## 2024-07-23 ENCOUNTER — HOSPITAL ENCOUNTER (OUTPATIENT)
Dept: LAB | Facility: MEDICAL CENTER | Age: 39
End: 2024-07-23
Attending: NURSE PRACTITIONER
Payer: COMMERCIAL

## 2024-07-23 DIAGNOSIS — E78.5 DYSLIPIDEMIA: ICD-10-CM

## 2024-07-23 DIAGNOSIS — R73.01 IMPAIRED FASTING GLUCOSE: ICD-10-CM

## 2024-07-23 DIAGNOSIS — E55.9 VITAMIN D DEFICIENCY: ICD-10-CM

## 2024-07-23 DIAGNOSIS — Z00.00 ANNUAL PHYSICAL EXAM: ICD-10-CM

## 2024-07-23 DIAGNOSIS — Z00.6 RESEARCH STUDY PATIENT: ICD-10-CM

## 2024-07-23 LAB
25(OH)D3 SERPL-MCNC: 20 NG/ML (ref 30–100)
ALBUMIN SERPL BCP-MCNC: 4.4 G/DL (ref 3.2–4.9)
ALBUMIN/GLOB SERPL: 1.3 G/DL
ALP SERPL-CCNC: 77 U/L (ref 30–99)
ALT SERPL-CCNC: 24 U/L (ref 2–50)
ANION GAP SERPL CALC-SCNC: 13 MMOL/L (ref 7–16)
AST SERPL-CCNC: 19 U/L (ref 12–45)
BASOPHILS # BLD AUTO: 0.8 % (ref 0–1.8)
BASOPHILS # BLD: 0.06 K/UL (ref 0–0.12)
BILIRUB SERPL-MCNC: 0.6 MG/DL (ref 0.1–1.5)
BUN SERPL-MCNC: 17 MG/DL (ref 8–22)
CALCIUM ALBUM COR SERPL-MCNC: 9.3 MG/DL (ref 8.5–10.5)
CALCIUM SERPL-MCNC: 9.6 MG/DL (ref 8.5–10.5)
CHLORIDE SERPL-SCNC: 103 MMOL/L (ref 96–112)
CHOLEST SERPL-MCNC: 225 MG/DL (ref 100–199)
CO2 SERPL-SCNC: 23 MMOL/L (ref 20–33)
CREAT SERPL-MCNC: 1.37 MG/DL (ref 0.5–1.4)
EOSINOPHIL # BLD AUTO: 0.06 K/UL (ref 0–0.51)
EOSINOPHIL NFR BLD: 0.8 % (ref 0–6.9)
ERYTHROCYTE [DISTWIDTH] IN BLOOD BY AUTOMATED COUNT: 40.4 FL (ref 35.9–50)
EST. AVERAGE GLUCOSE BLD GHB EST-MCNC: 100 MG/DL
GFR SERPLBLD CREATININE-BSD FMLA CKD-EPI: 68 ML/MIN/1.73 M 2
GLOBULIN SER CALC-MCNC: 3.4 G/DL (ref 1.9–3.5)
GLUCOSE SERPL-MCNC: 88 MG/DL (ref 65–99)
HBA1C MFR BLD: 5.1 % (ref 4–5.6)
HCT VFR BLD AUTO: 50.6 % (ref 42–52)
HDLC SERPL-MCNC: 54 MG/DL
HGB BLD-MCNC: 17 G/DL (ref 14–18)
IMM GRANULOCYTES # BLD AUTO: 0.03 K/UL (ref 0–0.11)
IMM GRANULOCYTES NFR BLD AUTO: 0.4 % (ref 0–0.9)
LDLC SERPL CALC-MCNC: 146 MG/DL
LYMPHOCYTES # BLD AUTO: 2.78 K/UL (ref 1–4.8)
LYMPHOCYTES NFR BLD: 36 % (ref 22–41)
MCH RBC QN AUTO: 30.2 PG (ref 27–33)
MCHC RBC AUTO-ENTMCNC: 33.6 G/DL (ref 32.3–36.5)
MCV RBC AUTO: 90 FL (ref 81.4–97.8)
MONOCYTES # BLD AUTO: 0.77 K/UL (ref 0–0.85)
MONOCYTES NFR BLD AUTO: 10 % (ref 0–13.4)
NEUTROPHILS # BLD AUTO: 4.03 K/UL (ref 1.82–7.42)
NEUTROPHILS NFR BLD: 52 % (ref 44–72)
NRBC # BLD AUTO: 0 K/UL
NRBC BLD-RTO: 0 /100 WBC (ref 0–0.2)
PLATELET # BLD AUTO: 218 K/UL (ref 164–446)
PMV BLD AUTO: 12.1 FL (ref 9–12.9)
POTASSIUM SERPL-SCNC: 4.1 MMOL/L (ref 3.6–5.5)
PROT SERPL-MCNC: 7.8 G/DL (ref 6–8.2)
RBC # BLD AUTO: 5.62 M/UL (ref 4.7–6.1)
SODIUM SERPL-SCNC: 139 MMOL/L (ref 135–145)
TRIGL SERPL-MCNC: 123 MG/DL (ref 0–149)
TSH SERPL DL<=0.005 MIU/L-ACNC: 2.98 UIU/ML (ref 0.38–5.33)
WBC # BLD AUTO: 7.7 K/UL (ref 4.8–10.8)

## 2024-07-23 PROCEDURE — 80053 COMPREHEN METABOLIC PANEL: CPT

## 2024-07-23 PROCEDURE — 83036 HEMOGLOBIN GLYCOSYLATED A1C: CPT

## 2024-07-23 PROCEDURE — 84443 ASSAY THYROID STIM HORMONE: CPT

## 2024-07-23 PROCEDURE — 80061 LIPID PANEL: CPT

## 2024-07-23 PROCEDURE — 85025 COMPLETE CBC W/AUTO DIFF WBC: CPT

## 2024-07-23 PROCEDURE — 36415 COLL VENOUS BLD VENIPUNCTURE: CPT

## 2024-07-23 PROCEDURE — 82306 VITAMIN D 25 HYDROXY: CPT

## 2024-08-06 LAB
APOB+LDLR+PCSK9 GENE MUT ANL BLD/T: NOT DETECTED
BRCA1+BRCA2 DEL+DUP + FULL MUT ANL BLD/T: NOT DETECTED
MLH1+MSH2+MSH6+PMS2 GN DEL+DUP+FUL M: NOT DETECTED

## 2024-11-12 ENCOUNTER — OFFICE VISIT (OUTPATIENT)
Dept: URGENT CARE | Facility: CLINIC | Age: 39
End: 2024-11-12
Payer: COMMERCIAL

## 2024-11-12 VITALS
RESPIRATION RATE: 14 BRPM | TEMPERATURE: 96.9 F | SYSTOLIC BLOOD PRESSURE: 128 MMHG | WEIGHT: 235 LBS | DIASTOLIC BLOOD PRESSURE: 74 MMHG | HEART RATE: 72 BPM | HEIGHT: 73 IN | BODY MASS INDEX: 31.14 KG/M2 | OXYGEN SATURATION: 93 %

## 2024-11-12 DIAGNOSIS — K64.4 EXTERNAL HEMORRHOID: ICD-10-CM

## 2024-11-12 PROCEDURE — 3078F DIAST BP <80 MM HG: CPT | Performed by: STUDENT IN AN ORGANIZED HEALTH CARE EDUCATION/TRAINING PROGRAM

## 2024-11-12 PROCEDURE — 99213 OFFICE O/P EST LOW 20 MIN: CPT | Performed by: STUDENT IN AN ORGANIZED HEALTH CARE EDUCATION/TRAINING PROGRAM

## 2024-11-12 PROCEDURE — 3074F SYST BP LT 130 MM HG: CPT | Performed by: STUDENT IN AN ORGANIZED HEALTH CARE EDUCATION/TRAINING PROGRAM

## 2024-11-12 RX ORDER — HYDROCORTISONE 25 MG/G
1 CREAM TOPICAL 2 TIMES DAILY
Qty: 28 G | Refills: 0 | Status: SHIPPED | OUTPATIENT
Start: 2024-11-12 | End: 2024-11-26

## 2024-11-12 RX ORDER — LIDOCAINE 40 MG/G
1 CREAM TOPICAL 2 TIMES DAILY
Qty: 28 G | Refills: 0 | Status: SHIPPED | OUTPATIENT
Start: 2024-11-12 | End: 2024-11-26

## 2024-11-12 ASSESSMENT — FIBROSIS 4 INDEX: FIB4 SCORE: 0.66

## 2024-11-12 NOTE — PROGRESS NOTES
"Subjective:   Franco Meléndez is a 38 y.o. male who presents for Hemorrhoids (X 2 days )      HPI:  38-year-old male presents to urgent care for 2 days of worsening external hemorrhoid.  Reports a history of hemorrhoids including internal and external.  Has had external hemorrhoid with gastroenterology.  States that area has been swelling over the past 2 days and has also become increasingly more painful.  No bloody stool or melena.  Not having any abdominal pain or vomiting.    Medications:    hydrocortisone rectal Crea  lidocaine Crea    Allergies: Amoxicillin and Azithromycin    Problem List: Franco Meléndez does not have any pertinent problems on file.    Surgical History:  Past Surgical History:   Procedure Laterality Date    HERNIA REPAIR      left side with mesh    TONSILLECTOMY AND ADENOIDECTOMY         Past Social Hx: Franco Meléndez  reports that he has never smoked. He has never used smokeless tobacco. He reports current alcohol use. He reports that he does not use drugs.     Past Family Hx:  Franco Meléndez family history includes Cancer in his paternal grandfather and another family member; No Known Problems in his father, maternal grandfather, maternal grandmother, and mother.     Problem list, medications, and allergies reviewed by myself today in Epic.     Objective:     /74   Pulse 72   Temp 36.1 °C (96.9 °F) (Temporal)   Resp 14   Ht 1.854 m (6' 1\")   Wt 107 kg (235 lb)   SpO2 93%   BMI 31.00 kg/m²     Physical Exam  Genitourinary:         Comments: Tender external hemorrhoid on exam.  Normal skin coloration.  No active bleeding or fluctuance.  No erythema or proximal streaking        Assessment/Plan:     Diagnosis and associated orders:     1. External hemorrhoid  Referral to Gastroenterology    hydrocortisone rectal (PERIANAL) 2.5% Cream    lidocaine (LMX) 4 % Cream         Comments/MDM:     Patient's presentation physical exam findings are consistent with external hemorrhoid.  " This does appear to be starting to become thrombosed.  Not consistent with perianal or perirectal abscess.  Urgent referral to gastroenterology for further evaluation.  Hydrocortisone topical and lidocaine topical prescribed.  Discussed with patient if this continues to worsen or is not improving with these medications he should present to the emergency department for removal.         Differential diagnosis, natural history, supportive care, and indications for immediate follow-up discussed.    Advised the patient to follow-up with the primary care physician for recheck, reevaluation, and consideration of further management.    Please note that this dictation was created using voice recognition software. I have made a reasonable attempt to correct obvious errors, but I expect that there are errors of grammar and possibly content that I did not discover before finalizing the note.    Electronically signed by Darrion Cam PA-C.

## 2024-11-21 ENCOUNTER — HOSPITAL ENCOUNTER (OUTPATIENT)
Facility: MEDICAL CENTER | Age: 39
End: 2024-11-21
Payer: COMMERCIAL

## 2024-11-21 ENCOUNTER — OFFICE VISIT (OUTPATIENT)
Dept: URGENT CARE | Facility: CLINIC | Age: 39
End: 2024-11-21
Payer: COMMERCIAL

## 2024-11-21 VITALS
HEART RATE: 69 BPM | TEMPERATURE: 96.4 F | WEIGHT: 232 LBS | HEIGHT: 73 IN | BODY MASS INDEX: 30.75 KG/M2 | DIASTOLIC BLOOD PRESSURE: 76 MMHG | RESPIRATION RATE: 18 BRPM | OXYGEN SATURATION: 96 % | SYSTOLIC BLOOD PRESSURE: 112 MMHG

## 2024-11-21 DIAGNOSIS — R30.0 DYSURIA: ICD-10-CM

## 2024-11-21 LAB
APPEARANCE UR: CLEAR
BILIRUB UR STRIP-MCNC: NORMAL MG/DL
COLOR UR AUTO: YELLOW
GLUCOSE UR STRIP.AUTO-MCNC: NORMAL MG/DL
KETONES UR STRIP.AUTO-MCNC: NORMAL MG/DL
LEUKOCYTE ESTERASE UR QL STRIP.AUTO: NORMAL
NITRITE UR QL STRIP.AUTO: NORMAL
PH UR STRIP.AUTO: 5.5 [PH] (ref 5–8)
PROT UR QL STRIP: NORMAL MG/DL
RBC UR QL AUTO: NORMAL
SP GR UR STRIP.AUTO: 1.02
UROBILINOGEN UR STRIP-MCNC: 0.2 MG/DL

## 2024-11-21 PROCEDURE — 81002 URINALYSIS NONAUTO W/O SCOPE: CPT | Performed by: REGISTERED NURSE

## 2024-11-21 PROCEDURE — 3078F DIAST BP <80 MM HG: CPT | Performed by: REGISTERED NURSE

## 2024-11-21 PROCEDURE — 99214 OFFICE O/P EST MOD 30 MIN: CPT | Performed by: REGISTERED NURSE

## 2024-11-21 PROCEDURE — 3074F SYST BP LT 130 MM HG: CPT | Performed by: REGISTERED NURSE

## 2024-11-21 PROCEDURE — 87086 URINE CULTURE/COLONY COUNT: CPT

## 2024-11-21 RX ORDER — SULFAMETHOXAZOLE AND TRIMETHOPRIM 800; 160 MG/1; MG/1
1 TABLET ORAL 2 TIMES DAILY
Qty: 14 TABLET | Refills: 0 | Status: SHIPPED | OUTPATIENT
Start: 2024-11-21 | End: 2024-11-21

## 2024-11-21 RX ORDER — SULFAMETHOXAZOLE AND TRIMETHOPRIM 800; 160 MG/1; MG/1
1 TABLET ORAL 2 TIMES DAILY
Qty: 14 TABLET | Refills: 0 | Status: SHIPPED | OUTPATIENT
Start: 2024-11-21 | End: 2024-11-28

## 2024-11-21 ASSESSMENT — ENCOUNTER SYMPTOMS
PALPITATIONS: 0
SHORTNESS OF BREATH: 0
FEVER: 0
DIZZINESS: 0
HEADACHES: 0
CHILLS: 0
COUGH: 0
DIARRHEA: 0
NAUSEA: 0
FLANK PAIN: 0
DOUBLE VISION: 0
MYALGIAS: 0
VOMITING: 0
TINGLING: 0
WEAKNESS: 0
SORE THROAT: 0
BLURRED VISION: 0

## 2024-11-21 ASSESSMENT — FIBROSIS 4 INDEX: FIB4 SCORE: 0.66

## 2024-11-21 NOTE — PROGRESS NOTES
Subjective:   Franco Meléndez is a 38 y.o. male who presents for UTI (Irritation patient explains, has been using epsion salt baths, x 1-1.5 weeks, burning when urinating )    Patient presents for burning with urination for greater than 1 week.  He states that symptoms are intermittent, that he is getting burning in about half of the times that he urinates.  He denies any discharge, swelling, lesions, or redness with the urethra and penis.  Patient endorses high risk sexual behavior, multiple partners - had STD testing 1 week ago and 6 months ago that were both negative. Hx of chlamydia 20+ yrs ago, current symptoms does not feel like that per patient.  Reports current hemorrhoids that he has been taking sitz baths for, and he is concerned that this may could be contributing to the cause of his symptoms.  Denies fever/chills, body aches, chest pain, shortness of breath, or blood in the urine or stool.    UTI  Pertinent negatives include no chest pain, chills, congestion, coughing, fever, headaches, myalgias, nausea, sore throat, vomiting or weakness.       Review of Systems   Constitutional:  Negative for chills and fever.   HENT:  Negative for congestion, ear pain and sore throat.    Eyes:  Negative for blurred vision and double vision.   Respiratory:  Negative for cough and shortness of breath.    Cardiovascular:  Negative for chest pain and palpitations.   Gastrointestinal:  Negative for diarrhea, nausea and vomiting.   Genitourinary:  Positive for dysuria. Negative for flank pain, frequency, hematuria and urgency.   Musculoskeletal:  Negative for myalgias.   Neurological:  Negative for dizziness, tingling, weakness and headaches.   All other systems reviewed and are negative.    Refer to HPI for additional details.    During this visit, appropriate PPE was worn, and hand hygiene was performed.    PMH:  has a past medical history of Colon polyps.    MEDS:   Current Outpatient Medications:      "sulfamethoxazole-trimethoprim (BACTRIM DS) 800-160 MG tablet, Take 1 Tablet by mouth 2 times a day for 7 days., Disp: 14 Tablet, Rfl: 0    hydrocortisone rectal (PERIANAL) 2.5% Cream, Insert 1 Application into the rectum 2 times a day for 14 days., Disp: 28 g, Rfl: 0    lidocaine (LMX) 4 % Cream, Apply 1 Application topically 2 times a day for 14 days., Disp: 28 g, Rfl: 0    ALLERGIES:   Allergies   Allergen Reactions    Amoxicillin     Azithromycin      Pt reports that he gets an upset stomach      SURGHX:   Past Surgical History:   Procedure Laterality Date    HERNIA REPAIR      left side with mesh    TONSILLECTOMY AND ADENOIDECTOMY       SOCHX:  reports that he has never smoked. He has never used smokeless tobacco. He reports current alcohol use. He reports that he does not use drugs.    FH: Per HPI as applicable/pertinent.    Medications, Allergies, and current problem list reviewed today in Epic.     Objective:     /76 (BP Location: Left arm, Patient Position: Sitting, BP Cuff Size: Large adult)   Pulse 69   Temp (!) 35.8 °C (96.4 °F)   Resp 18   Ht 1.854 m (6' 1\")   Wt 105 kg (232 lb)   SpO2 96%     Physical Exam  Constitutional:       Appearance: Normal appearance. He is not ill-appearing or toxic-appearing.   HENT:      Head: Normocephalic.      Right Ear: External ear normal.      Left Ear: External ear normal.      Nose: Nose normal. No congestion or rhinorrhea.      Mouth/Throat:      Mouth: Mucous membranes are moist.      Pharynx: Oropharynx is clear.   Eyes:      General:         Right eye: No discharge.         Left eye: No discharge.      Extraocular Movements: Extraocular movements intact.      Conjunctiva/sclera: Conjunctivae normal.      Pupils: Pupils are equal, round, and reactive to light.   Cardiovascular:      Rate and Rhythm: Normal rate and regular rhythm.      Pulses: Normal pulses.      Heart sounds: Normal heart sounds. No murmur heard.  Pulmonary:      Effort: Pulmonary " effort is normal. No respiratory distress.      Breath sounds: Normal breath sounds. No wheezing or rhonchi.   Abdominal:      General: Abdomen is flat.      Tenderness: There is no abdominal tenderness. There is no right CVA tenderness, left CVA tenderness or guarding.   Genitourinary:     Comments: Patient refuses  assessment.  Musculoskeletal:         General: No signs of injury. Normal range of motion.      Cervical back: Normal range of motion. No rigidity.   Lymphadenopathy:      Cervical: No cervical adenopathy.   Skin:     General: Skin is warm and dry.   Neurological:      General: No focal deficit present.      Mental Status: He is alert and oriented to person, place, and time.      Motor: No weakness.         Lab Results/POC Test Results   Results for orders placed or performed in visit on 11/21/24   POCT Urinalysis    Collection Time: 11/21/24 10:13 AM   Result Value Ref Range    POC Color Yellow Negative    POC Appearance Clear Negative    POC Glucose NEG Negative mg/dL    POC Bilirubin NEG Negative mg/dL    POC Ketones NEG Negative mg/dL    POC Specific Gravity 1.020 <1.005 - >1.030    POC Blood NEG Negative    POC Urine PH 5.5 5.0 - 8.0    POC Protein NEG Negative mg/dL    POC Urobiligen 0.2 Negative (0.2) mg/dL    POC Nitrites NEG Negative    POC Leukocyte Esterase TRACE Negative          Assessment/Plan:     Diagnosis and associated orders:     1. Dysuria  - POCT Urinalysis  - URINE CULTURE(NEW); Future  - sulfamethoxazole-trimethoprim (BACTRIM DS) 800-160 MG tablet; Take 1 Tablet by mouth 2 times a day for 7 days.  Dispense: 14 Tablet; Refill: 0     Comments/MDM:     Given presentation, symptoms, physical exam, likely UTI.  Urinalysis done in the clinic that showed trace leukocytes, but otherwise negative.  Urine sent out for culture.  Discussed with patient that this provider will reach out to him via [employer] message and phone call regarding the results of the urine culture and any modifications  to treatment and management as needed.  Patient is agreeable with this plan of care.  Given his recent STI testing, patient denies repeat STI testing this visit.  Discussed home care & management, including proper dosing and administration of prescribed medications and continued monitoring for any improvement/changes of his symptoms.  RTC to clinic in 3 to 5 days if symptoms worsen or persist.  Red flag symptoms warranting emergency medical services discussed with patient, including but not limited to blood in his urine or stool, fever greater than 103 F, flank pain, chest pain, shortness of breath, vomiting/diarrhea.         Differential diagnosis, natural history, supportive care, and indications for immediate follow-up discussed.    Advised the patient to follow-up with the primary care physician for recheck, reevaluation, and consideration of further management.    Instructed patient to seek emergency medical attention via calling EMS or going to the Emergency Room for red flag symptoms, including but not limited to: chest pain, palpitations, fever greater than 103F, shortness of breath, wheezing, new or worsened numbness/tingling, focal or unilateral weakness, and bloody vomit/stool.     Please note that this dictation was created using voice recognition software. I have made a reasonable attempt to correct obvious errors, but I expect that there are errors of grammar and possibly content that I did not discover before finalizing the note.    This note was electronically signed by WAYLON Cano

## 2024-11-23 LAB
BACTERIA UR CULT: NORMAL
SIGNIFICANT IND 70042: NORMAL
SITE SITE: NORMAL
SOURCE SOURCE: NORMAL